# Patient Record
Sex: FEMALE | Employment: OTHER | URBAN - NONMETROPOLITAN AREA
[De-identification: names, ages, dates, MRNs, and addresses within clinical notes are randomized per-mention and may not be internally consistent; named-entity substitution may affect disease eponyms.]

---

## 2017-02-10 ENCOUNTER — MED REC SCAN ONLY (OUTPATIENT)
Dept: FAMILY MEDICINE CLINIC | Facility: CLINIC | Age: 27
End: 2017-02-10

## 2017-05-23 ENCOUNTER — OFFICE VISIT (OUTPATIENT)
Dept: FAMILY MEDICINE CLINIC | Facility: CLINIC | Age: 27
End: 2017-05-23

## 2017-05-23 VITALS
BODY MASS INDEX: 17 KG/M2 | SYSTOLIC BLOOD PRESSURE: 96 MMHG | OXYGEN SATURATION: 98 % | TEMPERATURE: 98 F | WEIGHT: 96 LBS | DIASTOLIC BLOOD PRESSURE: 64 MMHG | HEART RATE: 84 BPM

## 2017-05-23 DIAGNOSIS — J30.2 OTHER SEASONAL ALLERGIC RHINITIS: Primary | ICD-10-CM

## 2017-05-23 PROCEDURE — 99213 OFFICE O/P EST LOW 20 MIN: CPT | Performed by: FAMILY MEDICINE

## 2017-05-23 RX ORDER — MONTELUKAST SODIUM 10 MG/1
10 TABLET ORAL NIGHTLY
Qty: 30 TABLET | Refills: 1 | Status: SHIPPED | OUTPATIENT
Start: 2017-05-23 | End: 2017-07-16

## 2017-05-23 RX ORDER — PREDNISOLONE 15 MG/5 ML
1 SOLUTION, ORAL ORAL DAILY
Qty: 72.5 ML | Refills: 0 | Status: SHIPPED | OUTPATIENT
Start: 2017-05-23 | End: 2017-05-28

## 2017-05-23 NOTE — PROGRESS NOTES
HPI:   Leobardo Arcos is a 32year old female who presents for upper respiratory symptoms for  3  weeks. Patient reports congestion, dry cough, cough is keeping pt up at night, denies fever.   Here with father and caregiver    Current Outpatient [facility]crip INFANTS) 160 MG/5ML Oral Suspension Take 19.5 mL by mouth every 4 (four) hours as needed for Fever or Pain. Disp: 240 mL Rfl: 0   ibuprofen (MOTRIN) 100 MG/5ML Oral Suspension Take 10 mL by mouth every 6 (six) hours as needed.  Disp: 240 mL Rfl: 0   Choleca Hypertension Mother    • Other[other] [OTHER] Mother      HEMORRHAGIC STROKE        Smoking Status: Never Smoker                      Smokeless Status: Never Used                        Alcohol Use: No                  REVIEW OF SYSTEMS:   GENERAL: fever: allergen avoidance strategies. symptomatic treatment reviewed  Infection control reviewed  The patient indicates understanding of these issues and agrees to the plan. The patient is asked to return if sx's persist or worsen. [place] Mo.  Nathan Boehringer

## 2017-05-23 NOTE — PATIENT INSTRUCTIONS
Continue all same medications  May use albuterol nebulizer every 4 as needed for pulmonary toilet  We will start Prelone 15 mg/5 ML's, 15 mL's daily ×5 days  Singular 10 mg nightly  Keep head of bed elevated  Reviewed allergen avoidance strategies.

## 2017-06-30 ENCOUNTER — HOSPITAL ENCOUNTER (OUTPATIENT)
Age: 27
Discharge: HOME | End: 2017-06-30
Attending: FAMILY MEDICINE
Payer: MEDICARE

## 2017-06-30 VITALS
OXYGEN SATURATION: 97 % | WEIGHT: 94 LBS | HEART RATE: 73 BPM | TEMPERATURE: 99 F | BODY MASS INDEX: 17 KG/M2 | SYSTOLIC BLOOD PRESSURE: 102 MMHG | DIASTOLIC BLOOD PRESSURE: 60 MMHG | RESPIRATION RATE: 18 BRPM

## 2017-06-30 DIAGNOSIS — N30.00 ACUTE CYSTITIS WITHOUT HEMATURIA: Primary | ICD-10-CM

## 2017-06-30 LAB
POCT BILIRUBIN URINE: NEGATIVE
POCT GLUCOSE URINE: NEGATIVE MG/DL
POCT KETONE URINE: NEGATIVE MG/DL
POCT NITRITE URINE: NEGATIVE
POCT PH URINE: 7.5 (ref 5–8)
POCT PROTEIN URINE: NEGATIVE MG/DL
POCT SPECIFIC GRAVITY URINE: 1.01
POCT URINE CLARITY: CLEAR
POCT URINE COLOR: YELLOW
POCT URINE PREGNANCY: NEGATIVE
POCT UROBILINOGEN URINE: 0.2 MG/DL

## 2017-06-30 PROCEDURE — 99214 OFFICE O/P EST MOD 30 MIN: CPT

## 2017-06-30 PROCEDURE — 81002 URINALYSIS NONAUTO W/O SCOPE: CPT | Performed by: FAMILY MEDICINE

## 2017-06-30 PROCEDURE — 87086 URINE CULTURE/COLONY COUNT: CPT | Performed by: FAMILY MEDICINE

## 2017-06-30 PROCEDURE — 81025 URINE PREGNANCY TEST: CPT | Performed by: FAMILY MEDICINE

## 2017-07-01 NOTE — ED PROVIDER NOTES
Patient Seen in: [address] [facility] [place]    History   Patient presents with:  Urinary Symptoms (urologic)    Stated Complaint: UTI     HPI  33 yo F here with her father with hx of CP and multiple UTIs in the past caregiver told her parents that s BID PRN .  TH[address]OU[address]Y [address] HANDS AFTER USE   ferrous sulfate 220 (44 FE) MG/5ML Oral Elixir,  TAKE 1.5 ML VIA G-TUBE EVERY MORNING AND 2 ML EVERY EVENING   CUSTOM MEDICATION,  Granlex spray spray on hands to apply topically to affected area 2 times a day Solution,  by Intrathecal route. Diazepam (DIASTAT ACUDIAL) 10 MG Rectal Gel,  Place  rectally as needed. Glycopyrrolate (ROBINUL) 1 MG Oral Tab,  Take 1 mg by mouth 2 (two) times daily.        Family History   Problem Relation Age of Onset   • Hyperten - Normal   URINE CULTURE, ROUTINE       Orders Placed This Encounter      POCT urinalysis dipstick Once      POCT urine pregnancy Once      Urine Culture, Routine Once      Sulfamethoxazole-Trimethoprim 800-160 MG/20ML Oral Suspension          Si ml t

## 2017-07-17 RX ORDER — MONTELUKAST SODIUM 10 MG/1
TABLET ORAL
Qty: 90 TABLET | Refills: 0 | Status: SHIPPED | OUTPATIENT
Start: 2017-07-17 | End: 2017-10-17

## 2017-08-16 ENCOUNTER — MED REC SCAN ONLY (OUTPATIENT)
Dept: FAMILY MEDICINE CLINIC | Facility: CLINIC | Age: 27
End: 2017-08-16

## 2017-08-20 LAB
BUN: 15 MG/DL (ref 7–25)
CALCIUM: 9.5 MG/DL (ref 8.6–10.2)
CARBON DIOXIDE: 24 MMOL/L (ref 20–31)
CHLORIDE: 104 MMOL/L (ref 98–110)
CREATININE: 0.62 MG/DL (ref 0.5–1.1)
EGFR IF AFRICN AM: 144 ML/MIN/1.73M2
EGFR IF NONAFRICN AM: 124 ML/MIN/1.73M2
GLUCOSE: 98 MG/DL (ref 65–99)
MAGNESIUM: 2 MG/DL (ref 1.5–2.5)
POTASSIUM: 4.6 MMOL/L (ref 3.5–5.3)
SODIUM: 141 MMOL/L (ref 135–146)

## 2017-10-17 RX ORDER — MONTELUKAST SODIUM 10 MG/1
TABLET ORAL
Qty: 90 TABLET | Refills: 0 | Status: SHIPPED | OUTPATIENT
Start: 2017-10-17 | End: 2018-01-09

## 2017-10-17 NOTE — TELEPHONE ENCOUNTER
Last rf 7/17/17 #90x0  Last ov 5/23/17    Future Appointments  Date Time Provider Johanne Howard   10/24/2017 3:30 PM Mirela De Anda., DO EM[facility]

## 2017-10-24 ENCOUNTER — OFFICE VISIT (OUTPATIENT)
Dept: FAMILY MEDICINE CLINIC | Facility: CLINIC | Age: 27
End: 2017-10-24

## 2017-10-24 VITALS
SYSTOLIC BLOOD PRESSURE: 118 MMHG | DIASTOLIC BLOOD PRESSURE: 60 MMHG | TEMPERATURE: 98 F | BODY MASS INDEX: 17 KG/M2 | WEIGHT: 96 LBS | HEART RATE: 76 BPM | OXYGEN SATURATION: 98 %

## 2017-10-24 DIAGNOSIS — M41.9 KYPHOSCOLIOSIS AND SCOLIOSIS: ICD-10-CM

## 2017-10-24 DIAGNOSIS — H47.619 CORTICAL BLINDNESS, UNSPECIFIED LATERALITY: ICD-10-CM

## 2017-10-24 DIAGNOSIS — J30.2 OTHER SEASONAL ALLERGIC RHINITIS: ICD-10-CM

## 2017-10-24 DIAGNOSIS — Z00.00 PREVENTATIVE HEALTH CARE: Primary | ICD-10-CM

## 2017-10-24 DIAGNOSIS — G80.0 SPASTIC QUADRIPLEGIC CEREBRAL PALSY (HCC): ICD-10-CM

## 2017-10-24 DIAGNOSIS — K59.01 SLOW TRANSIT CONSTIPATION: ICD-10-CM

## 2017-10-24 DIAGNOSIS — R56.9 SEIZURES (HCC): ICD-10-CM

## 2017-10-24 DIAGNOSIS — K21.9 GASTROESOPHAGEAL REFLUX DISEASE WITHOUT ESOPHAGITIS: ICD-10-CM

## 2017-10-24 DIAGNOSIS — N31.9 NEUROGENIC BLADDER: ICD-10-CM

## 2017-10-24 PROCEDURE — G0008 ADMIN INFLUENZA VIRUS VAC: HCPCS | Performed by: FAMILY MEDICINE

## 2017-10-24 PROCEDURE — 90686 IIV4 VACC NO PRSV 0.5 ML IM: CPT | Performed by: FAMILY MEDICINE

## 2017-10-24 PROCEDURE — 99214 OFFICE O/P EST MOD 30 MIN: CPT | Performed by: FAMILY MEDICINE

## 2017-10-24 RX ORDER — METHENAMINE MANDELATE 1000 MG/1
1000 TABLET, FILM COATED ORAL 4 TIMES DAILY
COMMUNITY
End: 2020-04-13 | Stop reason: CLARIF

## 2017-10-24 RX ORDER — PSEUDOEPHEDRINE HYDROCHLORIDE 30 MG/1
30 TABLET ORAL 2 TIMES DAILY PRN
COMMUNITY

## 2017-10-24 RX ORDER — FLUTICASONE PROPIONATE 50 MCG
2 SPRAY, SUSPENSION (ML) NASAL DAILY
COMMUNITY

## 2017-10-24 NOTE — PATIENT INSTRUCTIONS
Reviewed age-appropriate preventative health screening exams as well as prevention of infection. Flu vaccine given today  We will need to monitor bowel movements.   Consider use of a suppository  Medication list reviewed  Would recommend routine GYN evalua

## 2017-10-24 NOTE — H&P
HPI:   Mckenzie Negron is a 32year old female Patient presents with:  Physical: [address] annual, [employer]  here with father and caregiver.   Reports of abd bloating, seems better after a BM, eats lots of cheese  Wt Readings from Last 6 Encounters:  10 Shortness of Breath. Disp: 1 Box Rfl: 1   Linaclotide (LINZESS) 145 MCG Oral Cap Take 145 mcg by mouth daily. Disp:  Rfl:    Misc Natural Products (CRANBERRY/PROBIOTIC OR) Take 1 tablet by mouth daily.  Disp:  Rfl:    Potassium Gluconate 595 MG Oral Cap Evangelina Michelle HISTORY      Comment: INTRATHECAL PUMP  No date: OTHER SURGICAL HISTORY      Comment: HAMSTRING LENGTHENING  No date: OTHER SURGICAL HISTORY      Comment: BILATERAL HIP SURGERY  No date: OTHER SURGICAL HISTORY      Comment: POSTERIOR SPINAL FUSION FOR SCOL methanamine  MUSCULOSKELETAL: denies back or joint pain  NEURO: denies headaches, tremors, dizziness, numbness, tingling, truncal weakness, no recent seizures  PSYCHE: denies depression or anxiety, denies any sleep difficulty  HEMATOLOGIC: denies unexplain constipation  Gastroesophageal reflux disease without esophagitis  Other seasonal allergic rhinitis  Kyphoscoliosis and scoliosis  Neurogenic bladder  Need for vaccination    Orders Placed This Encounter      FLULAVAL INFLUENZA VACCINE QUAD PRESERVATIVE FR

## 2017-11-06 ENCOUNTER — TELEPHONE (OUTPATIENT)
Dept: FAMILY MEDICINE CLINIC | Facility: CLINIC | Age: 27
End: 2017-11-06

## 2017-11-06 RX ORDER — ALBUTEROL SULFATE 2.5 MG/3ML
2.5 SOLUTION RESPIRATORY (INHALATION) EVERY 4 HOURS PRN
Qty: 1 BOX | Refills: 1 | Status: SHIPPED | OUTPATIENT
Start: 2017-11-06 | End: 2018-02-12

## 2017-12-15 ENCOUNTER — TELEPHONE (OUTPATIENT)
Dept: FAMILY MEDICINE CLINIC | Facility: CLINIC | Age: 27
End: 2017-12-15

## 2017-12-15 ENCOUNTER — HOSPITAL ENCOUNTER (OUTPATIENT)
Age: 27
Discharge: HOME | End: 2017-12-15
Payer: MEDICARE

## 2017-12-15 VITALS
RESPIRATION RATE: 16 BRPM | TEMPERATURE: 98 F | WEIGHT: 96 LBS | OXYGEN SATURATION: 98 % | DIASTOLIC BLOOD PRESSURE: 73 MMHG | HEART RATE: 77 BPM | SYSTOLIC BLOOD PRESSURE: 124 MMHG | BODY MASS INDEX: 17.01 KG/M2 | HEIGHT: 63 IN

## 2017-12-15 DIAGNOSIS — R10.30 LOWER ABDOMINAL PAIN: Primary | ICD-10-CM

## 2017-12-15 DIAGNOSIS — R63.0 DECREASED APPETITE: ICD-10-CM

## 2017-12-15 PROCEDURE — 99214 OFFICE O/P EST MOD 30 MIN: CPT

## 2017-12-15 PROCEDURE — 87086 URINE CULTURE/COLONY COUNT: CPT | Performed by: PHYSICIAN ASSISTANT

## 2017-12-15 PROCEDURE — 81002 URINALYSIS NONAUTO W/O SCOPE: CPT | Performed by: PHYSICIAN ASSISTANT

## 2017-12-15 PROCEDURE — 99213 OFFICE O/P EST LOW 20 MIN: CPT

## 2017-12-15 NOTE — ED INITIAL ASSESSMENT (HPI)
Dad sts lower abd pain for the past several days. Concerned about UTI as has them frequently. Decreased appetite. Mcneal loose stools. Denies fever, vomiting.

## 2017-12-15 NOTE — ED PROVIDER NOTES
Patient Seen in: [address] [facility]    History   Patient presents with:  Abdomen/Flank Pain (GI/)  Urinary Symptoms (urologic)    Stated Complaint: possible UTI    HPI  Omar Bruner is a 69-year-old female who presents with her father and a care LENGTHENING  No date: OTHER SURGICAL HISTORY      Comment: BILATERAL HIP SURGERY  No date: OTHER SURGICAL HISTORY      Comment: POSTERIOR SPINAL FUSION FOR SCOLIOSIS  2/2011  Ladi: OTHER SURGICAL HISTORY      Comment: Laparoscopic placement of WILFRED ish Abdominal: Soft. There is no tenderness. There is no CVA tenderness. Pump site and G-tube site without erythema or purulence. Non-tender. Neurological: She is alert and oriented to person, place, and time. Skin: Skin is warm.  She is not diaphoreti

## 2017-12-29 ENCOUNTER — TELEPHONE (OUTPATIENT)
Dept: FAMILY MEDICINE CLINIC | Facility: CLINIC | Age: 27
End: 2017-12-29

## 2017-12-29 NOTE — TELEPHONE ENCOUNTER
When was the neb machine prescribed? Pt has medicare and they want to know this info. He needs the albuteral liquid covered.

## 2018-01-09 RX ORDER — MONTELUKAST SODIUM 10 MG/1
TABLET ORAL
Qty: 90 TABLET | Refills: 0 | Status: SHIPPED | OUTPATIENT
Start: 2018-01-09 | End: 2018-04-12

## 2018-02-12 RX ORDER — ALBUTEROL SULFATE 2.5 MG/3ML
SOLUTION RESPIRATORY (INHALATION)
Qty: 75 ML | Refills: 2 | Status: SHIPPED | OUTPATIENT
Start: 2018-02-12 | End: 2018-06-07

## 2018-04-12 RX ORDER — MONTELUKAST SODIUM 10 MG/1
TABLET ORAL
Qty: 90 TABLET | Refills: 0 | Status: SHIPPED | OUTPATIENT
Start: 2018-04-12 | End: 2018-07-12

## 2018-06-08 RX ORDER — ALBUTEROL SULFATE 2.5 MG/3ML
SOLUTION RESPIRATORY (INHALATION)
Qty: 75 ML | Refills: 0 | Status: SHIPPED | OUTPATIENT
Start: 2018-06-08 | End: 2018-08-29

## 2018-07-12 RX ORDER — MONTELUKAST SODIUM 10 MG/1
TABLET ORAL
Qty: 90 TABLET | Refills: 0 | Status: SHIPPED | OUTPATIENT
Start: 2018-07-12 | End: 2018-10-09

## 2018-07-12 NOTE — TELEPHONE ENCOUNTER
Last OV: 5/23/2017  Last filled: 4/12/2018 #90 no RF    Future Appointments  Date Time Provider Johanne Howard   10/29/2018 3:30 PM Sally Lake., DO EM[facility]

## 2018-08-10 ENCOUNTER — TELEPHONE (OUTPATIENT)
Dept: FAMILY MEDICINE CLINIC | Facility: CLINIC | Age: 28
End: 2018-08-10

## 2018-08-10 NOTE — TELEPHONE ENCOUNTER
Philly Faria needs Sonal's disability paperwork fill out, he will drop it by this afternoon. pls call when ready.

## 2018-08-16 ENCOUNTER — MED REC SCAN ONLY (OUTPATIENT)
Dept: FAMILY MEDICINE CLINIC | Facility: CLINIC | Age: 28
End: 2018-08-16

## 2018-08-24 ENCOUNTER — TELEPHONE (OUTPATIENT)
Dept: FAMILY MEDICINE CLINIC | Facility: CLINIC | Age: 28
End: 2018-08-24

## 2018-08-28 ENCOUNTER — TELEPHONE (OUTPATIENT)
Dept: FAMILY MEDICINE CLINIC | Facility: CLINIC | Age: 28
End: 2018-08-28

## 2018-08-28 RX ORDER — MEDROXYPROGESTERONE ACETATE 150 MG/ML
150 INJECTION, SUSPENSION INTRAMUSCULAR
Qty: 1 ML | Refills: 0 | COMMUNITY
Start: 2018-08-28

## 2018-08-29 RX ORDER — ALBUTEROL SULFATE 2.5 MG/3ML
SOLUTION RESPIRATORY (INHALATION)
Qty: 75 ML | Refills: 0 | Status: SHIPPED | OUTPATIENT
Start: 2018-08-29 | End: 2019-01-14

## 2018-09-18 RX ORDER — CLOTRIMAZOLE AND BETAMETHASONE DIPROPIONATE 10; .64 MG/G; MG/G
CREAM TOPICAL
Qty: 15 G | Refills: 0 | Status: SHIPPED | OUTPATIENT
Start: 2018-09-18 | End: 2019-08-29

## 2018-09-18 NOTE — TELEPHONE ENCOUNTER
Discharge planning/Health Maintenance:  1)  screens:    --->thyroid   --->normal  2) CCHD screen: passed  3) Hearing screen: passed b/l   4) Carseat challenge: needed prior to discharge  5) Immunizations:   There is no immunization histor Last script 4/28/2016  Last OV 10/24/17

## 2018-10-09 RX ORDER — MONTELUKAST SODIUM 10 MG/1
TABLET ORAL
Qty: 90 TABLET | Refills: 0 | Status: SHIPPED | OUTPATIENT
Start: 2018-10-09 | End: 2019-01-09

## 2018-11-09 ENCOUNTER — OFFICE VISIT (OUTPATIENT)
Dept: FAMILY MEDICINE CLINIC | Facility: CLINIC | Age: 28
End: 2018-11-09
Payer: MEDICARE

## 2018-11-09 VITALS
WEIGHT: 95 LBS | BODY MASS INDEX: 17 KG/M2 | TEMPERATURE: 99 F | DIASTOLIC BLOOD PRESSURE: 60 MMHG | SYSTOLIC BLOOD PRESSURE: 100 MMHG

## 2018-11-09 DIAGNOSIS — K59.01 SLOW TRANSIT CONSTIPATION: ICD-10-CM

## 2018-11-09 DIAGNOSIS — Z00.00 ENCOUNTER FOR ANNUAL HEALTH EXAMINATION: Primary | ICD-10-CM

## 2018-11-09 DIAGNOSIS — H47.619 CORTICAL BLINDNESS, UNSPECIFIED LATERALITY: ICD-10-CM

## 2018-11-09 DIAGNOSIS — Z23 NEED FOR VACCINATION: ICD-10-CM

## 2018-11-09 DIAGNOSIS — R56.9 SEIZURES (HCC): ICD-10-CM

## 2018-11-09 DIAGNOSIS — J30.89 NON-SEASONAL ALLERGIC RHINITIS, UNSPECIFIED TRIGGER: ICD-10-CM

## 2018-11-09 DIAGNOSIS — K21.9 GASTROESOPHAGEAL REFLUX DISEASE WITHOUT ESOPHAGITIS: ICD-10-CM

## 2018-11-09 DIAGNOSIS — N31.9 NEUROGENIC BLADDER: ICD-10-CM

## 2018-11-09 DIAGNOSIS — M41.9 KYPHOSCOLIOSIS AND SCOLIOSIS: ICD-10-CM

## 2018-11-09 DIAGNOSIS — G80.0 SPASTIC QUADRIPLEGIC CEREBRAL PALSY (HCC): ICD-10-CM

## 2018-11-09 PROCEDURE — 90686 IIV4 VACC NO PRSV 0.5 ML IM: CPT | Performed by: FAMILY MEDICINE

## 2018-11-09 PROCEDURE — G0439 PPPS, SUBSEQ VISIT: HCPCS | Performed by: FAMILY MEDICINE

## 2018-11-09 PROCEDURE — G0008 ADMIN INFLUENZA VIRUS VAC: HCPCS | Performed by: FAMILY MEDICINE

## 2018-11-09 NOTE — PATIENT INSTRUCTIONS
Naga Freitas's SCREENING SCHEDULE   Tests on this list are recommended by your physician but may not be covered, or covered at this frequency, by your insurer. Please check with your insurance carrier before scheduling to verify coverage.    Jonas Decree Sigmoidoscopy Screen  Covered every 5 years No results found for this or any previous visit. No flowsheet data found. Fecal Occult Blood   Covered Annually No results found for: FOB, OCCULTSTOOL No flowsheet data found.      Barium Enema-   uncomfortabl VIRUS VACCINE, PRESERV FREE, >=1YEARS OF AGE    Please get every year    Pneumococcal 13 (Prevnar)  Covered Once after 65 No orders found for this or any previous visit.  Please get once after your 65th birthday    Pneumococcal 23 (Pneumovax)  Covered Once examination  I reviewed age-appropriate preventive health screening exams including immunizations. Flu vaccine provided.   Patient is not capable of self administering medication list to have all medication administered by a licensed or medication trained

## 2018-11-09 NOTE — H&P
HPI:   Elsa Cushing is a 29year old female Patient presents with:  Physical: Medicare AWV  here with father and aide, pt had labs in May with neurologist  Wt Readings from Last 6 Encounters:  11/09/18 : 95 lb  12/15/17 : 96 lb  10/24/17 : 96 lb  06/3 0.1 % External Cream Apply 1 Application topically daily as needed. Disp: 15 g Rfl: 0   valproic Acid 250 MG/5ML Oral Syrup by Per G Tube route 2 (two) times daily.  8 ml BID  Disp: 600 mL Rfl: 0   Polyethylene Glycol 3350 Oral Powder  Disp:  Rfl: 0   [facility] [address]      Past Surgical History:   Procedure Laterality Date   • OTHER SURGICAL HISTORY      G-TUBE   • OTHER SURGICAL HISTORY      HIMA FUNDOPLICATION   • OTHER SURGICAL HISTORY      BILTERAL EYE SURGIERIES   • OTHER SURGICAL HISTORY      INTRATHECAL miralax  : denies dysuria, vaginal discharge or itching, no menses on depo-provera, + incontinence , recurrent UTIs-managed on bid methanamine, saw Urologist several months ago, getting Depo-provera thru Dr Durant Males: denies back or joint yue trunk  EXTREMITIES:  no cyanosis, clubbing or edema, no varicosities  NEURO: Patient able to answer yes or no questions, occasional one-word answers, sensory grossly intact, DTRs +4/4 bilaterally  PSYCH:  Speech soft, appearance: neat, Affect:smiling  ASSE GLUCOSE (mg/dL)   Date Value   08/19/2017 98    Medicare covers annually or at 6-month intervals for prediabetic patients        Cardiovascular Disease Screening     Cholesterol, covered every 5 yrs including Total, LDL and Trigs No results found f condition    No results found for this or any previous visit. No flowsheet data found.     Recommended for 2 year anniversary or as ordered in After Visit Summary   Pap and Pelvic      Pap: Every 3 yrs age 21-65 or Pap+HPV every 5 yrs age 33-67, Covered chandra 10/06/12   • TETANUS AND DIPHTHERIA, PRESERVE FREE    This may be covered with your prescription benefits, but Medicare does not cover unless Medically needed    Zoster (Not covered by Medicare Part B) No orders found for this or any previous visit.  This m scoliosis  Monitor clinically. Monitor skin closely for any areas of breakdown at pressure points    9. Non-seasonal allergic rhinitis, unspecified trigger  Discussed common allergens and avoidance strategies. Continue current medication    10.  Need for

## 2018-11-10 ENCOUNTER — MED REC SCAN ONLY (OUTPATIENT)
Dept: FAMILY MEDICINE CLINIC | Facility: CLINIC | Age: 28
End: 2018-11-10

## 2019-01-10 RX ORDER — MONTELUKAST SODIUM 10 MG/1
TABLET ORAL
Qty: 90 TABLET | Refills: 0 | Status: SHIPPED | OUTPATIENT
Start: 2019-01-10 | End: 2019-04-15

## 2019-01-14 ENCOUNTER — OFFICE VISIT (OUTPATIENT)
Dept: FAMILY MEDICINE CLINIC | Facility: CLINIC | Age: 29
End: 2019-01-14
Payer: MEDICARE

## 2019-01-14 VITALS
TEMPERATURE: 99 F | SYSTOLIC BLOOD PRESSURE: 118 MMHG | DIASTOLIC BLOOD PRESSURE: 68 MMHG | HEART RATE: 74 BPM | OXYGEN SATURATION: 96 %

## 2019-01-14 DIAGNOSIS — G80.0 SPASTIC QUADRIPLEGIC CEREBRAL PALSY (HCC): ICD-10-CM

## 2019-01-14 DIAGNOSIS — J00 ACUTE NASOPHARYNGITIS: Primary | ICD-10-CM

## 2019-01-14 PROCEDURE — 99213 OFFICE O/P EST LOW 20 MIN: CPT | Performed by: FAMILY MEDICINE

## 2019-01-14 RX ORDER — ALBUTEROL SULFATE 2.5 MG/3ML
SOLUTION RESPIRATORY (INHALATION)
Qty: 75 ML | Refills: 2 | Status: SHIPPED | OUTPATIENT
Start: 2019-01-14

## 2019-01-14 NOTE — PROGRESS NOTES
HPI:    Patient ID: Gallo Simmons is a 29year old female. Patient presents with:  Nasal Congestion: pt getting over cold. . nasal congestion. .    Got sick x 4-5 days, better today, no fever, HA, pressure,nasal dc, eyes watering  Slight cough, +pnd times daily. 8 ml BID  Disp: 600 mL Rfl: 0   Linaclotide (LINZESS) 145 MCG Oral Cap Take 72 mcg by mouth 2 (two) times daily. Disp:  Rfl:    Misc Natural Products (CRANBERRY/PROBIOTIC OR) Take 1 tablet by mouth daily.  Disp:  Rfl:    Potassium Gluconate 5 patient   HENT:   Right Ear: Tympanic membrane normal.   Left Ear: Tympanic membrane normal.   Nose: Mucosal edema and rhinorrhea ( Copious clear mucoid discharge) present. Right sinus exhibits no maxillary sinus tenderness and no frontal sinus tenderness.

## 2019-01-14 NOTE — PATIENT INSTRUCTIONS
I reviewed the viral nature of the illness as well as anticipated duration, course of progression of symptoms to resolution.   I reviewed signs and symptoms of secondary bacterial infection  Nasal saline ad dwight., Mucinex or equivalent expectorant twice morena

## 2019-02-05 ENCOUNTER — MED REC SCAN ONLY (OUTPATIENT)
Dept: FAMILY MEDICINE CLINIC | Facility: CLINIC | Age: 29
End: 2019-02-05

## 2019-02-20 ENCOUNTER — HOSPITAL ENCOUNTER (OUTPATIENT)
Age: 29
Discharge: ANOTHER HOSPITAL | End: 2019-02-20
Attending: FAMILY MEDICINE
Payer: MEDICARE

## 2019-02-20 ENCOUNTER — TELEPHONE (OUTPATIENT)
Dept: FAMILY MEDICINE CLINIC | Facility: CLINIC | Age: 29
End: 2019-02-20

## 2019-02-20 VITALS
RESPIRATION RATE: 24 BRPM | WEIGHT: 94 LBS | TEMPERATURE: 99 F | SYSTOLIC BLOOD PRESSURE: 109 MMHG | DIASTOLIC BLOOD PRESSURE: 77 MMHG | OXYGEN SATURATION: 95 % | BODY MASS INDEX: 17 KG/M2 | HEART RATE: 149 BPM

## 2019-02-20 DIAGNOSIS — R41.82 ALTERED MENTAL STATUS, UNSPECIFIED ALTERED MENTAL STATUS TYPE: ICD-10-CM

## 2019-02-20 DIAGNOSIS — R53.83 LETHARGY: Primary | ICD-10-CM

## 2019-02-20 DIAGNOSIS — R14.0 ABDOMINAL BLOATING: ICD-10-CM

## 2019-02-20 PROCEDURE — 99213 OFFICE O/P EST LOW 20 MIN: CPT

## 2019-02-20 PROCEDURE — 99212 OFFICE O/P EST SF 10 MIN: CPT

## 2019-02-20 NOTE — ED INITIAL ASSESSMENT (HPI)
Pt started feeling uncomfortable last night. Patient was not acting like her usual self. Pt has been lethargic all day and has been given a lot of water per dad, but has not urinated very much throughout the day.  water given water orally and through g-tube

## 2019-02-21 NOTE — ED PROVIDER NOTES
Patient Seen in: [address] [facility] [place]    History   Patient presents with:  Constipation (gastrointestinal)  Abdominal Pain    Stated Complaint: Vomit-like Feeling,Difficulty Breathing,No Urination/Bloating    HPI    17-year-old female with a h [place]    G-button change   • OTHER SURGICAL HISTORY  2013    INTRATHECAL BACLOFEN PUMP EXCHANGE       Family history reviewed and is not pertinent to presenting problem.     Social History    Tobacco Use      Smoking status: Never Smoker      Smokeless t nerves are intact, motor and sensory are grossly intact        ED Course   Labs Reviewed - No data to display             MDM   36year-old female with history of cerebral palsy presenting with abdominal distention/bloating, nausea with a leaking G-tube.

## 2019-02-26 ENCOUNTER — TELEPHONE (OUTPATIENT)
Dept: FAMILY MEDICINE CLINIC | Facility: CLINIC | Age: 29
End: 2019-02-26

## 2019-02-26 NOTE — TELEPHONE ENCOUNTER
Please obtain records of hospitalization for review. I can determine my need to see her based on discharge records.   Specifically obtain any imaging, consultations, procedures, discharge summaries

## 2019-02-26 NOTE — TELEPHONE ENCOUNTER
PT. WENT TO THE ER LAST WED. UPPER BOWEL OBSTRUCTION THAT REQUIRED SURGERY. EVERYTHING WENT WELL AND PT. HOME. DAD IS ASKING IF DR. STEPHENS WOULD LIKE TO SEE NONI?

## 2019-02-27 NOTE — TELEPHONE ENCOUNTER
Request faxed to [facility] medical records dept @ [phone]. Dad advised that we will call him after Dr. Richelle Rollins reviews records.

## 2019-02-28 NOTE — TELEPHONE ENCOUNTER
I saw the admitting eval, but no dc summary, op note , dc instructions  I should probably see her sometime next week before I go on vacation, her main f/u should be the surgeon

## 2019-03-01 NOTE — TELEPHONE ENCOUNTER
Sent another request to [facility] for remaining records. Dad advised of below. He will c/b to schedule an OV next week when he knows what day she has to f/u with surgeon.

## 2019-03-06 ENCOUNTER — MED REC SCAN ONLY (OUTPATIENT)
Dept: FAMILY MEDICINE CLINIC | Facility: CLINIC | Age: 29
End: 2019-03-06

## 2019-03-23 ENCOUNTER — HOSPITAL ENCOUNTER (OUTPATIENT)
Age: 29
Discharge: HOME | End: 2019-03-23
Attending: FAMILY MEDICINE
Payer: MEDICARE

## 2019-03-23 VITALS
RESPIRATION RATE: 20 BRPM | TEMPERATURE: 99 F | WEIGHT: 97 LBS | HEART RATE: 128 BPM | BODY MASS INDEX: 17 KG/M2 | OXYGEN SATURATION: 98 %

## 2019-03-23 DIAGNOSIS — R68.89 FLU-LIKE SYMPTOMS: ICD-10-CM

## 2019-03-23 DIAGNOSIS — R10.9 ABDOMINAL PAIN, ACUTE: Primary | ICD-10-CM

## 2019-03-23 PROCEDURE — 99213 OFFICE O/P EST LOW 20 MIN: CPT

## 2019-03-23 PROCEDURE — 99212 OFFICE O/P EST SF 10 MIN: CPT

## 2019-03-23 NOTE — ED INITIAL ASSESSMENT (HPI)
Hx of bowel obstruction 1 month ago, fever 101 yesterday, sinus drainage, pt told caregiver she was short of breath. Last motrin at 0630, today caregiver reports no urine output and no bowel movement. States suctioning pt a lot lately.  Using afrin and mucc

## 2019-03-23 NOTE — ED PROVIDER NOTES
Patient Seen in: [address] [place]    History   Patient presents with:  Sinusitis  Shortness Of Breath  Fever (infectious)    Stated Complaint: Fever sob headache    HPI    54-year-old female previous history of cerebral palsy [employer]    Positive for stated complaint: Fever sob headache  Other systems are as noted in HPI. Constitutional and vital signs reviewed. All other systems reviewed and negative except as noted above.     Physical Exam     ED Triage Vitals [03/23/19 0

## 2019-03-27 ENCOUNTER — OFFICE VISIT (OUTPATIENT)
Dept: FAMILY MEDICINE CLINIC | Facility: CLINIC | Age: 29
End: 2019-03-27
Payer: MEDICARE

## 2019-03-27 VITALS
SYSTOLIC BLOOD PRESSURE: 100 MMHG | TEMPERATURE: 99 F | OXYGEN SATURATION: 97 % | DIASTOLIC BLOOD PRESSURE: 64 MMHG | HEART RATE: 90 BPM

## 2019-03-27 DIAGNOSIS — R05.9 COUGH: ICD-10-CM

## 2019-03-27 DIAGNOSIS — G80.0 SPASTIC QUADRIPLEGIC CEREBRAL PALSY (HCC): ICD-10-CM

## 2019-03-27 DIAGNOSIS — J00 ACUTE NASOPHARYNGITIS: ICD-10-CM

## 2019-03-27 DIAGNOSIS — R19.7 DIARRHEA, UNSPECIFIED TYPE: ICD-10-CM

## 2019-03-27 DIAGNOSIS — H47.619 CORTICAL BLINDNESS, UNSPECIFIED LATERALITY: ICD-10-CM

## 2019-03-27 DIAGNOSIS — M41.9 KYPHOSCOLIOSIS AND SCOLIOSIS: ICD-10-CM

## 2019-03-27 DIAGNOSIS — N30.01 ACUTE CYSTITIS WITH HEMATURIA: Primary | ICD-10-CM

## 2019-03-27 DIAGNOSIS — K56.609 SMALL BOWEL OBSTRUCTION (HCC): ICD-10-CM

## 2019-03-27 PROCEDURE — 99214 OFFICE O/P EST MOD 30 MIN: CPT | Performed by: FAMILY MEDICINE

## 2019-03-27 RX ORDER — NITROFURANTOIN 25; 75 MG/1; MG/1
CAPSULE ORAL
Refills: 0 | COMMUNITY
Start: 2019-03-23 | End: 2020-04-13 | Stop reason: CLARIF

## 2019-03-27 NOTE — PATIENT INSTRUCTIONS
I reviewed provided information regarding patient's ER visit consisting of labs.   We will attempt to obtain full records  With regard to diarrhea, would hold Linzess for several days until stools become firmer, increase probiotic to twice daily, increase f

## 2019-03-27 NOTE — PROGRESS NOTES
HPI:    Patient ID: Juliann Bo is a 29year old female. Patient presents with:  ER F/U: Saturday ----- has had green diarrhea, headaches.      Here with caregiver  [facility] with complaints of abdominal pain and (two) times daily. 8 ml BID  Disp: 600 mL Rfl: 0   Linaclotide (LINZESS) 145 MCG Oral Cap Take 72 mcg by mouth 2 (two) times daily. Disp:  Rfl:    Misc Natural Products (CRANBERRY/PROBIOTIC OR) Take 1 tablet by mouth daily.  Disp:  Rfl:    Potassium Gluco 0   ibuprofen (MOTRIN) 100 MG/5ML Oral Suspension Take 10 mL by mouth every 6 (six) hours as needed. Disp: 240 mL Rfl: 0   Ascorbic Acid (VITAMIN C) 500 MG Oral Tab Take 500 mg by mouth daily.  Disp:  Rfl:    Glycerin, Laxative, (GLYCERIN, CHILD, RE) Place nebulizer treatments up to every 4 hours as needed for cough or congestion  I reviewed signs and symptoms of respiratory distress  Finish nitrofurantoin as prescribed, monitor any abdominal distention and bowel habits  Monitor clinically and call with an u

## 2019-04-15 ENCOUNTER — TELEPHONE (OUTPATIENT)
Dept: FAMILY MEDICINE CLINIC | Facility: CLINIC | Age: 29
End: 2019-04-15

## 2019-04-15 RX ORDER — MONTELUKAST SODIUM 10 MG/1
TABLET ORAL
Qty: 90 TABLET | Refills: 1 | Status: SHIPPED | OUTPATIENT
Start: 2019-04-15 | End: 2019-10-21

## 2019-04-15 NOTE — TELEPHONE ENCOUNTER
[facility]. SENT TO SCAN STAT 4/15/19. NO EMR RERCORDS.   DOS REQ:  4/10/2018-4/13/2019

## 2019-05-21 ENCOUNTER — OFFICE VISIT (OUTPATIENT)
Dept: FAMILY MEDICINE CLINIC | Facility: CLINIC | Age: 29
End: 2019-05-21
Payer: MEDICARE

## 2019-05-21 VITALS
OXYGEN SATURATION: 97 % | DIASTOLIC BLOOD PRESSURE: 64 MMHG | BODY MASS INDEX: 21 KG/M2 | TEMPERATURE: 98 F | SYSTOLIC BLOOD PRESSURE: 100 MMHG | WEIGHT: 116 LBS | HEART RATE: 80 BPM

## 2019-05-21 DIAGNOSIS — K56.609 SMALL BOWEL OBSTRUCTION (HCC): Primary | ICD-10-CM

## 2019-05-21 DIAGNOSIS — K59.01 SLOW TRANSIT CONSTIPATION: ICD-10-CM

## 2019-05-21 DIAGNOSIS — G80.0 SPASTIC QUADRIPLEGIC CEREBRAL PALSY (HCC): ICD-10-CM

## 2019-05-21 PROCEDURE — 1111F DSCHRG MED/CURRENT MED MERGE: CPT | Performed by: FAMILY MEDICINE

## 2019-05-21 PROCEDURE — 99213 OFFICE O/P EST LOW 20 MIN: CPT | Performed by: FAMILY MEDICINE

## 2019-05-21 RX ORDER — BISACODYL 10 MG
10 SUPPOSITORY, RECTAL RECTAL DAILY
Qty: 30 SUPPOSITORY | Refills: 0 | Status: SHIPPED | OUTPATIENT
Start: 2019-05-21

## 2019-05-21 NOTE — PROGRESS NOTES
HPI:    Patient ID: Karen Powell is a 29year old female. Patient presents with:  Hospital F/U: SBO    Patient was seen at Dr. Magdaleno Klein office on 5/3/2019 for abdominal distention. A CT scan showed high-grade small bowel obstruction.   Patient was times daily as needed. Disp:  Rfl:    silver sulfADIAZINE 1 % External Cream Apply topically 2 (two) times daily as needed. Disp:  Rfl:    Cetirizine HCl (ZYRTEC CHILDRENS ALLERGY) 5 MG/5ML Oral Syrup Take 10 mL by mouth daily.  Disp:  Rfl:    Pseudoephedri mouth daily. Disp:  Rfl:    Glycerin, Laxative, (GLYCERIN, CHILD, RE) Place  rectally. Disp:  Rfl:    Baclofen 0.05 MG/ML Intrathecal Solution by Intrathecal route. Disp:  Rfl:    Diazepam (DIASTAT ACUDIAL) 10 MG Rectal Gel Place  rectally as needed.  Disp: total) rectally daily.        Imaging & Referrals:  None       #[id number]

## 2019-05-21 NOTE — PATIENT INSTRUCTIONS
I have asked father to sign a medical release so that I may obtain records for review. I reviewed bowel regimen to prevent constipation/obstipation leading to bowel obstruction.   Would at least double current fluid intake, would recommend MiraLAX one half

## 2019-06-04 ENCOUNTER — TELEPHONE (OUTPATIENT)
Dept: FAMILY MEDICINE CLINIC | Facility: CLINIC | Age: 29
End: 2019-06-04

## 2019-06-04 NOTE — TELEPHONE ENCOUNTER
WENT TO ER [facility] HAD BOWEL OBSTRUCTION AND HAD [facility] SURGERY AND THEY REMOVED 2/3 OF SMALL BOWEL.  LUIS

## 2019-07-28 LAB
ALBUMIN/GLOBULIN RATIO: 1.5 (CALC) (ref 1–2.5)
ALBUMIN: 4.2 G/DL (ref 3.6–5.1)
ALKALINE PHOSPHATASE: 40 U/L (ref 33–115)
ALT: 13 U/L (ref 6–29)
AST: 12 U/L (ref 10–30)
BILIRUBIN, TOTAL: 0.2 MG/DL (ref 0.2–1.2)
BUN: 11 MG/DL (ref 7–25)
CALCIUM: 9.7 MG/DL (ref 8.6–10.2)
CARBON DIOXIDE: 27 MMOL/L (ref 20–32)
CHLORIDE: 105 MMOL/L (ref 98–110)
CREATININE: 0.58 MG/DL (ref 0.5–1.1)
EGFR IF AFRICN AM: 145 ML/MIN/1.73M2
EGFR IF NONAFRICN AM: 125 ML/MIN/1.73M2
GLOBULIN: 2.8 G/DL (CALC) (ref 1.9–3.7)
GLUCOSE: 84 MG/DL (ref 65–99)
POTASSIUM: 4.7 MMOL/L (ref 3.5–5.3)
PROTEIN, TOTAL: 7 G/DL (ref 6.1–8.1)
SODIUM: 142 MMOL/L (ref 135–146)

## 2019-08-30 RX ORDER — CLOTRIMAZOLE AND BETAMETHASONE DIPROPIONATE 10; .64 MG/G; MG/G
CREAM TOPICAL
Qty: 15 G | Refills: 1 | Status: SHIPPED | OUTPATIENT
Start: 2019-08-30 | End: 2020-11-16

## 2019-10-21 RX ORDER — MONTELUKAST SODIUM 10 MG/1
TABLET ORAL
Qty: 90 TABLET | Refills: 0 | Status: SHIPPED | OUTPATIENT
Start: 2019-10-21 | End: 2020-01-27

## 2019-10-21 NOTE — TELEPHONE ENCOUNTER
Last refill #90 x 1 on 4/15/19  Last office visit on 5/21/19  Future Appointments   Date Time Provider Johanne Howard   11/11/2019  3:45 PM Delmar Penaloza., DO EM[facility]

## 2019-11-11 ENCOUNTER — TELEPHONE (OUTPATIENT)
Dept: FAMILY MEDICINE CLINIC | Facility: CLINIC | Age: 29
End: 2019-11-11

## 2019-11-11 ENCOUNTER — OFFICE VISIT (OUTPATIENT)
Dept: FAMILY MEDICINE CLINIC | Facility: CLINIC | Age: 29
End: 2019-11-11
Payer: MEDICARE

## 2019-11-11 VITALS
OXYGEN SATURATION: 97 % | WEIGHT: 102 LBS | HEART RATE: 101 BPM | BODY MASS INDEX: 18 KG/M2 | SYSTOLIC BLOOD PRESSURE: 104 MMHG | TEMPERATURE: 98 F | DIASTOLIC BLOOD PRESSURE: 68 MMHG

## 2019-11-11 DIAGNOSIS — Z00.00 ENCOUNTER FOR ANNUAL HEALTH EXAMINATION: Primary | ICD-10-CM

## 2019-11-11 DIAGNOSIS — K21.9 GASTROESOPHAGEAL REFLUX DISEASE WITHOUT ESOPHAGITIS: ICD-10-CM

## 2019-11-11 DIAGNOSIS — H47.619 CORTICAL BLINDNESS, UNSPECIFIED LATERALITY: ICD-10-CM

## 2019-11-11 DIAGNOSIS — R56.9 SEIZURES (HCC): ICD-10-CM

## 2019-11-11 DIAGNOSIS — M41.9 KYPHOSCOLIOSIS AND SCOLIOSIS: ICD-10-CM

## 2019-11-11 DIAGNOSIS — J30.89 NON-SEASONAL ALLERGIC RHINITIS, UNSPECIFIED TRIGGER: ICD-10-CM

## 2019-11-11 DIAGNOSIS — K59.09 CHRONIC CONSTIPATION: ICD-10-CM

## 2019-11-11 DIAGNOSIS — N31.9 NEUROGENIC BLADDER: ICD-10-CM

## 2019-11-11 DIAGNOSIS — J00 ACUTE NASOPHARYNGITIS: ICD-10-CM

## 2019-11-11 DIAGNOSIS — G80.0 SPASTIC QUADRIPLEGIC CEREBRAL PALSY (HCC): ICD-10-CM

## 2019-11-11 DIAGNOSIS — Z23 NEED FOR VACCINATION: ICD-10-CM

## 2019-11-11 PROBLEM — K56.609 SMALL BOWEL OBSTRUCTION (HCC): Status: RESOLVED | Noted: 2019-03-06 | Resolved: 2019-11-11

## 2019-11-11 PROCEDURE — 90686 IIV4 VACC NO PRSV 0.5 ML IM: CPT | Performed by: FAMILY MEDICINE

## 2019-11-11 PROCEDURE — G0439 PPPS, SUBSEQ VISIT: HCPCS | Performed by: FAMILY MEDICINE

## 2019-11-11 PROCEDURE — G0008 ADMIN INFLUENZA VIRUS VAC: HCPCS | Performed by: FAMILY MEDICINE

## 2019-11-11 PROCEDURE — 99213 OFFICE O/P EST LOW 20 MIN: CPT | Performed by: FAMILY MEDICINE

## 2019-11-11 RX ORDER — MULTIVITAMIN WITH IRON
250 TABLET ORAL 2 TIMES DAILY
COMMUNITY

## 2019-11-11 NOTE — H&P
HPI:   Hollie Arroyo is a 34year old female Patient presents with:  Physical: Medicare AWV  here with father  Stuffy nose, no fever, runny nose, no cough  Wt Readings from Last 6 Encounters:  11/11/19 : 102 lb (46.3 kg)  05/21/19 : 116 lb (52.6 kg)  0 MCG/ACT Nasal Suspension 2 sprays by Each Nare route daily. • Methenamine Mandelate 1 G Oral Tab Take 1,000 mg by mouth 4 (four) times daily. • Mometasone Furoate 0.1 % External Cream Apply 1 Application topically daily as needed.  15 g 0   • valpro • Esophageal reflux     [facility]   • History of chicken pox    • Seizures ([facility] [address]     SEES DR Avon Simmonds      Past Surgical History:   Procedure Laterality Date   • BOWEL RESECTION  06/03/2019    small bowel resection due to S blindness  HEENT: clear nasal drainage and congestion, pnd, denies ST, denies snoring and reported periods of apnea, denies hearing deficits, +drooling  LUNGS: denies shortness of breath with exertion, no coughing or wheezing  CARDIOVASCULAR: denies chest pale and congested with clear mucoid dc,Septum midline, Pharynx: no pnd, erythema, or airway obstruction, good dental repair  EYES:PERRLA, dysconjugate gaze,  Fundi: benign,conjunctiva are clear  NECK: supple,no adenopathy,no bruits, no thyromegaly or palp scheduling to verify coverage. PREVENTATIVE SERVICES  INDICATIONS AND SCHEDULE Internal Lab or Procedure External Lab or Procedure   Diabetes Screening      HbgA1C    At Least  Annually for Diabetics No results found for: A1C No flowsheet data found. found.      Barium Enema-   uncomfortable but covered  Covered but uncomfortable   Glaucoma Screening      Ophthalmology Visit   Covered annually for Diabetics, people with Glaucoma family history,   Americans over age 48   Americans over age Pneumococcal 23 (Pneumovax)  Covered Once after 65 Orders placed or performed in visit on 10/03/12   • PNEUMOCOCCAL IMMUNIZATION    Please get once after your 65th birthday    Hepatitis B for Moderate/High Risk       No orders found for this or any previou capable of self administering medication and must have all medication administered by a licensed her medication trained staff or parent. Will obtain records of most recent gynecologic exam.  Most recent labs from July reviewed.   Follow-up with specialist

## 2019-11-11 NOTE — H&P
HPI:   Syed Jenkins is a 34year old female Patient presents with:  Physical: Medicare AWV    Wt Readings from Last 6 Encounters:  05/21/19 : 116 lb (52.6 kg)  03/23/19 : 97 lb (44 kg)  02/20/19 : 94 lb (42.6 kg)  11/09/18 : 95 lb (43.1 kg)  12/15/17 Propionate 50 MCG/ACT Nasal Suspension 2 sprays by Each Nare route daily. • Methenamine Mandelate 1 G Oral Tab Take 1,000 mg by mouth 4 (four) times daily. • Mometasone Furoate 0.1 % External Cream Apply 1 Application topically daily as needed.  15 Seizures ([facility] [address]     SEES DR Karan Mo      Past Surgical History:   Procedure Laterality Date   • BOWEL RESECTION  06/03/2019    small bowel resection due to SBO @ [facility]   • OTHER SURGICAL HISTORY      G-TUBE   • OTHER SURGICAL HISTORY      BERNARD in bowel habits, all meds thru g-tube, all nutrition po except when decreased intake, constipation -controlled on miralax  : denies dysuria, vaginal discharge or itching, no menses on depo-provera, + incontinence , recurrent UTIs-managed on bid methanami percussion  :  deferred  RECTAL:deferred  MUSCULOSKELETAL: + scoliosis, + flexion contractures of both knees, elbows and wrists, hypotonia of the trunk  EXTREMITIES:  no cyanosis, clubbing or edema, no varicosities  NEURO: Patient able to answer yes or n

## 2019-11-11 NOTE — PATIENT INSTRUCTIONS
Eber Freitas's SCREENING SCHEDULE   Tests on this list are recommended by your physician but may not be covered, or covered at this frequency, by your insurer. Please check with your insurance carrier before scheduling to verify coverage.    Gregory Turpin Sigmoidoscopy Screen  Covered every 5 years No results found for this or any previous visit. No flowsheet data found. Fecal Occult Blood   Covered Annually No results found for: FOB, OCCULTSTOOL No flowsheet data found.      Barium Enema-   uncomfortabl VIRUS VACCINE, PRESERV FREE, >=1YEARS OF AGE    Please get every year    Pneumococcal 13 (Prevnar)  Covered Once after 65 No orders found for this or any previous visit.  Please get once after your 65th birthday    Pneumococcal 23 (Pneumovax)  Covered Once examination  I reviewed age-appropriate screening exams as well as advanced directives. Discussed safety concerns both in and out of the home. Patient continues to need assistance with all ADLs.   She is not capable of self administering medication and mu

## 2020-01-27 RX ORDER — MONTELUKAST SODIUM 10 MG/1
TABLET ORAL
Qty: 90 TABLET | Refills: 0 | Status: SHIPPED | OUTPATIENT
Start: 2020-01-27 | End: 2020-04-29

## 2020-02-19 ENCOUNTER — OFFICE VISIT (OUTPATIENT)
Dept: FAMILY MEDICINE CLINIC | Facility: CLINIC | Age: 30
End: 2020-02-19
Payer: MEDICARE

## 2020-02-19 VITALS
BODY MASS INDEX: 18 KG/M2 | SYSTOLIC BLOOD PRESSURE: 100 MMHG | TEMPERATURE: 98 F | DIASTOLIC BLOOD PRESSURE: 66 MMHG | RESPIRATION RATE: 20 BRPM | WEIGHT: 102 LBS | HEART RATE: 96 BPM

## 2020-02-19 DIAGNOSIS — Z98.890 S/P EXPLORATORY LAPAROTOMY: ICD-10-CM

## 2020-02-19 DIAGNOSIS — G80.0 SPASTIC QUADRIPLEGIC CEREBRAL PALSY (HCC): ICD-10-CM

## 2020-02-19 DIAGNOSIS — K59.09 CHRONIC CONSTIPATION: ICD-10-CM

## 2020-02-19 DIAGNOSIS — N31.9 NEUROGENIC BLADDER: ICD-10-CM

## 2020-02-19 DIAGNOSIS — R56.9 SEIZURES (HCC): ICD-10-CM

## 2020-02-19 DIAGNOSIS — Z87.19 S/P SMALL BOWEL OBSTRUCTION: Primary | ICD-10-CM

## 2020-02-19 PROCEDURE — 99214 OFFICE O/P EST MOD 30 MIN: CPT | Performed by: FAMILY MEDICINE

## 2020-02-19 NOTE — PATIENT INSTRUCTIONS
I reviewed available hospital records including imaging, labs, pathology and microbiology.   I reviewed discharge instructions  Would recommend restarting MiraLAX one half capful in 8 ounces of water daily via G-tube  Reviewed signs and symptoms of bowel ob

## 2020-02-19 NOTE — PROGRESS NOTES
HPI:    Patient ID: Anabel Woodruff is a 34year old female.     Patient presents with:  Hospital F/U: post-op      Date of Admission: 2/8/2020    Date of Discharge: 2/14/2020  Discharge condition is stable  Admitting Attending: Jacob Carias MD HISTORY  2013    INTRATHECAL BACLOFEN PUMP EXCHANGE   • OTHER SURGICAL HISTORY  02/19/2020    exp lap w/ lysis of adhesions     Review of Systems   Constitutional: Positive for appetite change ( improving). Negative for fever. HENT: Negative.     Respirat Nasal Suspension 2 sprays by Each Nare route daily. • Methenamine Mandelate 1 G Oral Tab Take 1,000 mg by mouth 4 (four) times daily. • Mometasone Furoate 0.1 % External Cream Apply 1 Application topically daily as needed.  15 g 0   • valproic Acid heard.  Pulmonary/Chest: Effort normal and breath sounds normal.   Abdominal: Soft. Bowel sounds are normal. She exhibits no distension. There is no tenderness. There is no rigidity, no rebound and no guarding.    Surgical incision w/ clean dressing in plac

## 2020-04-03 ENCOUNTER — TELEPHONE (OUTPATIENT)
Dept: FAMILY MEDICINE CLINIC | Facility: CLINIC | Age: 30
End: 2020-04-03

## 2020-04-03 NOTE — TELEPHONE ENCOUNTER
L/m for dad to c/b----    When and where is procedure being done? By whom?  We need pre-op paperwork/ orders

## 2020-04-03 NOTE — TELEPHONE ENCOUNTER
Pre-op orders/info received. Pre-op scheduled. Advised father to call from parking lot before entry.     Future Appointments   Date Time Provider Johanne Howard   4/13/2020  9:30 AM [facility] Lucero[facility]

## 2020-04-08 ENCOUNTER — MED REC SCAN ONLY (OUTPATIENT)
Dept: FAMILY MEDICINE CLINIC | Facility: CLINIC | Age: 30
End: 2020-04-08

## 2020-04-08 ENCOUNTER — LAB ENCOUNTER (OUTPATIENT)
Dept: LAB | Age: 30
End: 2020-04-08
Attending: NEUROLOGICAL SURGERY
Payer: MEDICARE

## 2020-04-08 DIAGNOSIS — T85.610S: ICD-10-CM

## 2020-04-08 DIAGNOSIS — Z01.818 PRE-OP TESTING: Primary | ICD-10-CM

## 2020-04-08 DIAGNOSIS — G80.0 SPASTIC QUADRIPLEGIC CEREBRAL PALSY (HCC): ICD-10-CM

## 2020-04-08 PROCEDURE — 84703 CHORIONIC GONADOTROPIN ASSAY: CPT

## 2020-04-08 PROCEDURE — 85610 PROTHROMBIN TIME: CPT

## 2020-04-08 PROCEDURE — 85730 THROMBOPLASTIN TIME PARTIAL: CPT

## 2020-04-08 PROCEDURE — 85025 COMPLETE CBC W/AUTO DIFF WBC: CPT

## 2020-04-08 PROCEDURE — 80048 BASIC METABOLIC PNL TOTAL CA: CPT

## 2020-04-13 ENCOUNTER — OFFICE VISIT (OUTPATIENT)
Dept: FAMILY MEDICINE CLINIC | Facility: CLINIC | Age: 30
End: 2020-04-13
Payer: MEDICARE

## 2020-04-13 VITALS
DIASTOLIC BLOOD PRESSURE: 74 MMHG | HEART RATE: 78 BPM | TEMPERATURE: 99 F | HEIGHT: 63 IN | WEIGHT: 97 LBS | BODY MASS INDEX: 17.19 KG/M2 | SYSTOLIC BLOOD PRESSURE: 110 MMHG | RESPIRATION RATE: 18 BRPM

## 2020-04-13 DIAGNOSIS — K59.09 CHRONIC CONSTIPATION: ICD-10-CM

## 2020-04-13 DIAGNOSIS — R56.9 SEIZURES (HCC): ICD-10-CM

## 2020-04-13 DIAGNOSIS — G80.0 SPASTIC QUADRIPLEGIC CEREBRAL PALSY (HCC): ICD-10-CM

## 2020-04-13 DIAGNOSIS — N31.9 NEUROGENIC BLADDER: ICD-10-CM

## 2020-04-13 DIAGNOSIS — H47.619 CORTICAL BLINDNESS, UNSPECIFIED LATERALITY: ICD-10-CM

## 2020-04-13 DIAGNOSIS — M41.9 KYPHOSCOLIOSIS AND SCOLIOSIS: ICD-10-CM

## 2020-04-13 DIAGNOSIS — Z01.818 PRE-OP EVALUATION: Primary | ICD-10-CM

## 2020-04-13 DIAGNOSIS — K21.9 GASTROESOPHAGEAL REFLUX DISEASE WITHOUT ESOPHAGITIS: ICD-10-CM

## 2020-04-13 PROCEDURE — 99214 OFFICE O/P EST MOD 30 MIN: CPT | Performed by: FAMILY MEDICINE

## 2020-04-13 RX ORDER — MEDROXYPROGESTERONE ACETATE 150 MG/ML
INJECTION, SUSPENSION INTRAMUSCULAR
COMMUNITY
Start: 2020-03-25 | End: 2020-04-13 | Stop reason: CLARIF

## 2020-04-13 RX ORDER — METHENAMINE HIPPURATE 1000 MG/1
1 TABLET ORAL EVERY 12 HOURS
COMMUNITY
Start: 2020-02-26

## 2020-04-13 RX ORDER — VALPROIC ACID 250 MG/5ML
SOLUTION ORAL
COMMUNITY
Start: 2020-02-18 | End: 2020-04-13 | Stop reason: DRUGHIGH

## 2020-04-13 RX ORDER — CLONAZEPAM 0.12 MG/1
TABLET, ORALLY DISINTEGRATING ORAL
COMMUNITY
Start: 2020-01-23 | End: 2021-12-07

## 2020-04-13 NOTE — H&P
HPI:   Syed Jenkins is a 34year old female Patient presents with:  Pre-Op Exam: baclofen pump replacement  Patient is being scheduled for a baclofen pump replacement on 4/20/2020 by Dr. Geronimo Mai at [facility].  Pt is here w/ her f of baclofen pump failure  6   • lansoprazole (PREVACID) 30 MG Oral Capsule Delayed Release SHAKE LIQUID WELL AND GIVE NONI 5 ML BY MOUTH EVERY MORNING BEFORE BREAKFAST  0   • Baclofen 0.05 MG/ML Intrathecal Solution by Intrathecal route.      • Glycopyrrol CHILD, RE) Place  rectally. • Diazepam (DIASTAT ACUDIAL) 10 MG Rectal Gel Place  rectally as needed.          Seasonal                     Past Medical History:   Diagnosis Date   • Chronic constipation    • Chronic rhinitis    • Cortical blindness    • via G-tube between's 600 mL and 1000 mL daily to supplement oral intake  Specialists: Dr Maddi Whipple- pain pump , Dr Young- gastroenterologist, Dr Steve Griggs- neurology in May, Dr Noemy Ocasio- Urologist, Dr Richard Gómez:   GENERAL: generally f dc,Septum midline, Pharynx: no pnd, erythema, or airway obstruction, good dental repair  EYES:PERRLA, dysconjugate gaze,  Fundi: benign,conjunctiva are clear  NECK: supple,no adenopathy,no bruits, no thyromegaly or palpable nodules  BREAST: deferred  LUNGS may cause a false-positive or false-negative result in this assay. If an hCG level is inconsistent with, or unsupported by, clinical evidence, results should be confirmed by an alternate hCG method (eg. Urine Qualitative hCG assay).     This test may exhib 04/08/2020 0.03  0.00 - 0.20 x10(3) uL Final   • Immature Granulocyte Absolute 04/08/2020 0.02  0.00 - 1.00 x10(3) uL Final   • Neutrophil % 04/08/2020 56.6  % Final   • Lymphocyte % 04/08/2020 32.6  % Final   • Monocyte % 04/08/2020 9.8  % Final   • Eosin

## 2020-04-29 RX ORDER — MONTELUKAST SODIUM 10 MG/1
TABLET ORAL
Qty: 90 TABLET | Refills: 0 | Status: SHIPPED | OUTPATIENT
Start: 2020-04-29 | End: 2020-07-28

## 2020-04-29 NOTE — TELEPHONE ENCOUNTER
Last refill #90 on 1/27/2020  Last office visit on 4/813/2020  No future appointments.   Refilled per [id number] protocol

## 2020-07-28 RX ORDER — MONTELUKAST SODIUM 10 MG/1
TABLET ORAL
Qty: 90 TABLET | Refills: 1 | Status: SHIPPED | OUTPATIENT
Start: 2020-07-28 | End: 2021-02-01

## 2020-08-03 ENCOUNTER — MED REC SCAN ONLY (OUTPATIENT)
Dept: FAMILY MEDICINE CLINIC | Facility: CLINIC | Age: 30
End: 2020-08-03

## 2020-08-06 ENCOUNTER — TELEPHONE (OUTPATIENT)
Dept: FAMILY MEDICINE CLINIC | Facility: CLINIC | Age: 30
End: 2020-08-06

## 2020-08-06 NOTE — TELEPHONE ENCOUNTER
RELEASED FROM [facility] YESTERDAY. FOR ? BACLACIN PUMP ISSUES. BUT LAST NIGHT DAD THINKS SHE VOMITED HE IS CONCERNED ABOUT POSSIBLE BOWEL BLOCKAGE AGAIN? ABDOMEN EXTENDED AND HARD TO TOUCH. NO FEVER LAST NIGHT.      SHE HAS FEVER 100.3 ALIDA MONTERO

## 2020-08-07 NOTE — TELEPHONE ENCOUNTER
Dad called today with an update. Pt went to [facility] ER yesterday and was taken into emergency surgery to remove bowel blockage. He stated they had to remove scar tissue from previous surgery.  Pt is doing well and they hope to be released early next wee

## 2020-09-09 ENCOUNTER — HOSPITAL ENCOUNTER (OUTPATIENT)
Age: 30
Discharge: ANOTHER HOSPITAL | End: 2020-09-09
Payer: MEDICARE

## 2020-09-09 ENCOUNTER — TELEPHONE (OUTPATIENT)
Dept: FAMILY MEDICINE CLINIC | Facility: CLINIC | Age: 30
End: 2020-09-09

## 2020-09-09 VITALS
RESPIRATION RATE: 18 BRPM | DIASTOLIC BLOOD PRESSURE: 54 MMHG | HEART RATE: 82 BPM | SYSTOLIC BLOOD PRESSURE: 99 MMHG | OXYGEN SATURATION: 99 % | TEMPERATURE: 97 F

## 2020-09-09 DIAGNOSIS — R41.82 ALTERED MENTAL STATUS, UNSPECIFIED ALTERED MENTAL STATUS TYPE: Primary | ICD-10-CM

## 2020-09-09 PROCEDURE — 99215 OFFICE O/P EST HI 40 MIN: CPT | Performed by: NURSE PRACTITIONER

## 2020-09-09 NOTE — ED PROVIDER NOTES
Patient Seen in: [id number] [facility]      History   Patient presents with:  Altered Mental Status    Stated Complaint: lethargic non responsive /abdominal pain some runny stools    HPI    66-year-old female here with her father with a signifi • OTHER SURGICAL HISTORY      BILTERAL EYE SURGIERIES   • OTHER SURGICAL HISTORY      INTRATHECAL PUMP   • OTHER SURGICAL HISTORY      HAMSTRING LENGTHENING   • OTHER SURGICAL HISTORY      BILATERAL HIP SURGERY   • OTHER SURGICAL HISTORY      POSTERIOR S Normal pulses. Heart sounds: Normal heart sounds. Pulmonary:      Effort: Pulmonary effort is normal.      Breath sounds: Normal breath sounds. Abdominal:      General: Abdomen is flat. A surgical scar is present. Bowel sounds are decreased. her immediately rather than doing a partial work-up here. He verbalizes understanding. He tells me he will take her to [facility] emergency department.   Nursing staff to call the emergency department at [facility] to inform them of her imminent arrival

## 2020-09-09 NOTE — TELEPHONE ENCOUNTER
If Milan Quesada is not very responsive and lethargic this morning, I would recommend she be taken to the urgent care for evaluation.   Given her propensity for urinary tract infections and bowel obstruction as well as recent surgery she will likely need labs and p

## 2020-09-09 NOTE — ED INITIAL ASSESSMENT (HPI)
Father reports hx of surgery a month ago for a bowel obstruction. Was admitted for a week and has been doing well. Pt is normally verbal. Reyes Madden had a large bowel movement followed by loose stools.  Yesterday had some soup but this am dad states she is n

## 2020-09-09 NOTE — TELEPHONE ENCOUNTER
[facility] is asking if she should schedule a virtual visit? Or will you see her here?  Or immediate care for eval?

## 2020-09-14 ENCOUNTER — TELEPHONE (OUTPATIENT)
Dept: FAMILY MEDICINE CLINIC | Facility: CLINIC | Age: 30
End: 2020-09-14

## 2020-09-14 NOTE — TELEPHONE ENCOUNTER
He needs to give you the update on her from her [facility] visit last week for a uti.

## 2020-09-14 NOTE — TELEPHONE ENCOUNTER
Dad calls to give update----PLEASE SEE RECORDS IN Epic. Dad asks when to have pt see you for hospital f/u?? Do you want him to wait until all cultures are back?

## 2020-09-18 ENCOUNTER — OFFICE VISIT (OUTPATIENT)
Dept: FAMILY MEDICINE CLINIC | Facility: CLINIC | Age: 30
End: 2020-09-18
Payer: MEDICARE

## 2020-09-18 VITALS
BODY MASS INDEX: 17 KG/M2 | OXYGEN SATURATION: 98 % | WEIGHT: 98 LBS | DIASTOLIC BLOOD PRESSURE: 64 MMHG | SYSTOLIC BLOOD PRESSURE: 98 MMHG | TEMPERATURE: 98 F | HEART RATE: 102 BPM

## 2020-09-18 DIAGNOSIS — N30.00 ACUTE CYSTITIS WITHOUT HEMATURIA: Primary | ICD-10-CM

## 2020-09-18 DIAGNOSIS — R56.9 SEIZURES (HCC): ICD-10-CM

## 2020-09-18 DIAGNOSIS — J90 PLEURAL EFFUSION ON RIGHT: ICD-10-CM

## 2020-09-18 DIAGNOSIS — G80.0 SPASTIC QUADRIPLEGIC CEREBRAL PALSY (HCC): ICD-10-CM

## 2020-09-18 PROCEDURE — 99214 OFFICE O/P EST MOD 30 MIN: CPT | Performed by: FAMILY MEDICINE

## 2020-09-18 NOTE — PROGRESS NOTES
HPI:    Patient ID: Mag Pickering is a 34year old female.     Patient presents with:  ER F/U: [facility] UTI  Here with father  Patient was brought to [facility], ER on 9/9 with complaints of generalized abdominal pain, lethargy and significantly les HISTORY      INTRATHECAL PUMP   • OTHER SURGICAL HISTORY      HAMSTRING LENGTHENING   • OTHER SURGICAL HISTORY      BILATERAL HIP SURGERY   • OTHER SURGICAL HISTORY      POSTERIOR SPINAL FUSION FOR SCOLIOSIS   • OTHER SURGICAL HISTORY  2/2011  Chelsey Dumont nightly as needed for Allergies. • Fluticasone Propionate 50 MCG/ACT Nasal Suspension 2 sprays by Each Nare route daily. • valproic Acid 250 MG/5ML Oral Syrup by Per G Tube route 2 (two) times daily.  8 ml BID  600 mL 0   • Polyethylene Glycol 3350 • ibuprofen (MOTRIN) 100 MG/5ML Oral Suspension Take 10 mL by mouth every 6 (six) hours as needed.  (Patient not taking: Reported on 4/13/2020 ) 240 mL 0     Allergies:  Levonorgestrel          OTHER (SEE COMMENTS)    Comment:uknown  Vancomycin intervention. Continue all same medication and follow-up with specialists as scheduled    Praful Bee. DO Jaja, JOSSELYNFP    No orders of the defined types were placed in this encounter.       Meds This Visit:  Requested Prescriptions      No prescriptions r

## 2020-09-18 NOTE — PATIENT INSTRUCTIONS
Over 50% appointment was spent in education and counseling as well as reviewing hospital records with patient's father.   I explained in layman's terms implication of blood cultures, CT scan results, pleural effusion etc.  With regard to the pleural effusio

## 2020-09-24 ENCOUNTER — TELEPHONE (OUTPATIENT)
Dept: FAMILY MEDICINE CLINIC | Facility: CLINIC | Age: 30
End: 2020-09-24

## 2020-09-24 NOTE — TELEPHONE ENCOUNTER
DAD CALLED IN AND STATED NONI IS HAVING ISSUES AGAIN AND WANTED TO SEE IF DR Kelechi Tovar WOULD SEE HER TODAY. DAD MADE AWARE DR Kelechi Tovar IS NOT IN OFFICE AGAIN UNTIL Tuesday. HE STATED HE WILL TAKE HER TO ER FOR EVALUATION.

## 2020-10-03 ENCOUNTER — IMMUNIZATION (OUTPATIENT)
Dept: FAMILY MEDICINE CLINIC | Facility: CLINIC | Age: 30
End: 2020-10-03
Payer: MEDICARE

## 2020-10-03 DIAGNOSIS — Z23 NEED FOR VACCINATION: ICD-10-CM

## 2020-10-03 PROCEDURE — G0008 ADMIN INFLUENZA VIRUS VAC: HCPCS | Performed by: FAMILY MEDICINE

## 2020-10-03 PROCEDURE — 90686 IIV4 VACC NO PRSV 0.5 ML IM: CPT | Performed by: FAMILY MEDICINE

## 2020-10-14 ENCOUNTER — TELEPHONE (OUTPATIENT)
Dept: FAMILY MEDICINE CLINIC | Facility: CLINIC | Age: 30
End: 2020-10-14

## 2020-10-14 ENCOUNTER — APPOINTMENT (OUTPATIENT)
Dept: LAB | Facility: HOSPITAL | Age: 30
End: 2020-10-14
Attending: FAMILY MEDICINE
Payer: MEDICARE

## 2020-10-14 DIAGNOSIS — Z20.822 EXPOSURE TO COVID-19 VIRUS: Primary | ICD-10-CM

## 2020-10-14 DIAGNOSIS — Z20.822 EXPOSURE TO COVID-19 VIRUS: ICD-10-CM

## 2020-10-14 NOTE — TELEPHONE ENCOUNTER
Define \"presence\", significant exposure is greater than 15 minutes 10 feet without face covering. If you have been in close contact with someone who has COVID-19, you should be tested, even if you do not have symptoms of COVID-19.    If your test is nega

## 2020-10-14 NOTE — TELEPHONE ENCOUNTER
Spoke with pt's father. He states that pt was in the presence of a caretaker who tested positive for COVID this morning. Pt is currently having no symptoms.

## 2020-10-14 NOTE — TELEPHONE ENCOUNTER
Pt's father confirms that the pt has been in contact with caregiver without face coverage for more than 15 minutes. Can you place an order so she can be tested with father?

## 2020-10-30 ENCOUNTER — MED REC SCAN ONLY (OUTPATIENT)
Dept: FAMILY MEDICINE CLINIC | Facility: CLINIC | Age: 30
End: 2020-10-30

## 2020-11-09 NOTE — ED INITIAL ASSESSMENT (HPI)
Per dad, was told by pt's caregiver about possible uti sx starting. Pt incontinent, hx of frequent uti's.   Was able to get specimen last night and did an at home test 0 = independent

## 2020-11-16 RX ORDER — CLOTRIMAZOLE AND BETAMETHASONE DIPROPIONATE 10; .64 MG/G; MG/G
CREAM TOPICAL
Qty: 15 G | Refills: 1 | Status: SHIPPED | OUTPATIENT
Start: 2020-11-16 | End: 2021-11-29

## 2020-11-16 NOTE — TELEPHONE ENCOUNTER
Last refill: 8/30/19 #15 w/ 1 refill    Last OV: 9/18/20  Last labs: 2014 with us    Future Appointments   Date Time Provider Johanne Howard   12/16/2020  1:30 PM Sally Lake., DO [facility]

## 2020-12-16 ENCOUNTER — OFFICE VISIT (OUTPATIENT)
Dept: FAMILY MEDICINE CLINIC | Facility: CLINIC | Age: 30
End: 2020-12-16
Payer: MEDICARE

## 2020-12-16 VITALS
SYSTOLIC BLOOD PRESSURE: 106 MMHG | WEIGHT: 95 LBS | OXYGEN SATURATION: 98 % | HEART RATE: 84 BPM | TEMPERATURE: 97 F | DIASTOLIC BLOOD PRESSURE: 70 MMHG | BODY MASS INDEX: 17 KG/M2

## 2020-12-16 DIAGNOSIS — H57.13 EYE PAIN, BILATERAL: ICD-10-CM

## 2020-12-16 DIAGNOSIS — K59.09 CHRONIC CONSTIPATION: ICD-10-CM

## 2020-12-16 DIAGNOSIS — N31.9 NEUROGENIC BLADDER: ICD-10-CM

## 2020-12-16 DIAGNOSIS — Z00.00 ENCOUNTER FOR ANNUAL HEALTH EXAMINATION: Primary | ICD-10-CM

## 2020-12-16 DIAGNOSIS — H47.619 CORTICAL BLINDNESS, UNSPECIFIED LATERALITY: ICD-10-CM

## 2020-12-16 DIAGNOSIS — J30.89 NON-SEASONAL ALLERGIC RHINITIS, UNSPECIFIED TRIGGER: ICD-10-CM

## 2020-12-16 DIAGNOSIS — G80.0 SPASTIC QUADRIPLEGIC CEREBRAL PALSY (HCC): ICD-10-CM

## 2020-12-16 DIAGNOSIS — Z98.890 S/P EXPLORATORY LAPAROTOMY: ICD-10-CM

## 2020-12-16 DIAGNOSIS — R56.9 SEIZURES (HCC): ICD-10-CM

## 2020-12-16 DIAGNOSIS — D23.4: ICD-10-CM

## 2020-12-16 DIAGNOSIS — K21.9 GASTROESOPHAGEAL REFLUX DISEASE WITHOUT ESOPHAGITIS: ICD-10-CM

## 2020-12-16 DIAGNOSIS — M41.9 KYPHOSCOLIOSIS AND SCOLIOSIS: ICD-10-CM

## 2020-12-16 PROCEDURE — 99213 OFFICE O/P EST LOW 20 MIN: CPT | Performed by: FAMILY MEDICINE

## 2020-12-16 PROCEDURE — G0439 PPPS, SUBSEQ VISIT: HCPCS | Performed by: FAMILY MEDICINE

## 2020-12-16 NOTE — PATIENT INSTRUCTIONS
Kristen Freitas's SCREENING SCHEDULE   Tests on this list are recommended by your physician but may not be covered, or covered at this frequency, by your insurer. Please check with your insurance carrier before scheduling to verify coverage.    Mara Maldonado Update Health Maintenance if applicable    Flex Sigmoidoscopy Screen  Covered every 5 years No results found for this or any previous visit. No flowsheet data found.      Fecal Occult Blood   Covered Annually No results found for: FOB, OCCULTSTOOL No flowsh performed in visit on 10/03/12   • INFLUENZA VIRUS VACCINE, PRESERV FREE, >=1YEARS OF AGE    Please get every year    Pneumococcal 13 (Prevnar)  Covered Once after 65 No orders found for this or any previous visit.  Please get once after your 65th birthday Directives. 1. Encounter for annual health examination  I reviewed age-appropriate federal screening exams as well as advanced directives. Discussed age-appropriate physicians.   Immunizations are up-to-date, continue annual flu vaccines  I discussed the

## 2020-12-16 NOTE — H&P
HPI:   Roxana Acosta is a 27year old female Patient presents with:  Physical: Medicare AWV  pt has a sore spot on her scalp, hits it with her comb. Here w/ her father  Pt c/o eyes bother her , ?  Duration    Wt Readings from Last 6 Encounters:  12/16/ ([facility]) 5 MG/5ML Oral Syrup Take 10 mL by mouth daily. • Pseudoephedrine HCl 30 MG Oral Tab Take 30 mg by mouth 2 (two) times daily as needed for congestion.      • DiphenhydrAMINE HCl 12.5 MG/5ML Oral Liquid Take 25 mg by mouth night Oral Tab Take 1 mg by mouth 2 (two) times daily.          Levonorgestrel          OTHER (SEE COMMENTS)    Comment:uknown  Vancomycin              OTHER (SEE COMMENTS)    Comment:Scout syndrome  Seasonal                     Past Medical History:   Diagnosis general diet and will receive Ensure or boost via G-tube when appetite is poor, weight loss or illness decreases adequate caloric intake.  Patient will also receive free water via G-tube between's 1000 mL and 1500 mL daily to supplement oral intake  [employer]? yes  SAFETY: Does your home have throw rugs? no, Adequate lighting? yes, Grab bars in bathroom/shower/tub? yes,  Handrails on stairs?  yes, Some alarms? yes   Get and Go test > 12 seconds?  yes  EXAM:   /70   Pulse 84   Temp 97.3 °F (36.3 °C) unspecified laterality-bilateral  Kyphoscoliosis and scoliosis  Non-seasonal allergic rhinitis, unspecified trigger  S/p exploratory laparotomy w/ lysis of adhesions  No orders of the defined types were placed in this encounter.     Meds & Refills for this results found for this or any previous visit.  Limited to patients who meet one of the following criteria:   • Men who are 73-68 years old and have smoked more than 100 cigarettes in their lifetime   • Anyone with a family history    Colorectal Cancer Scree for this patient.  Please get this Mammogram regularly   Immunizations      Influenza  Covered Annually Orders placed or performed in visit on 11/30/16   • FLU VAC NO PRSV 4 SARAH 3 YRS+   Orders placed or performed in visit on 11/13/15   • [address] the [place]. This site has a lot of good information including definitions of the different types of Advance Directives.  It also has the State forms available on it's website for anyone to review and print using their home computer a w/ lysis of adhesions  Monitor clinically for recurrent obstruction    Saida Stephens.  Angelo Davila, FAAFP

## 2021-02-01 RX ORDER — MONTELUKAST SODIUM 10 MG/1
TABLET ORAL
Qty: 90 TABLET | Refills: 1 | Status: SHIPPED | OUTPATIENT
Start: 2021-02-01 | End: 2021-07-31

## 2021-03-31 ENCOUNTER — PATIENT MESSAGE (OUTPATIENT)
Dept: FAMILY MEDICINE CLINIC | Facility: CLINIC | Age: 31
End: 2021-03-31

## 2021-04-01 NOTE — TELEPHONE ENCOUNTER
From: Marlene Torres  To: Natividad Avendano.  Antonia Felipe DO  Sent: 3/31/2021 8:12 PM CDT  Subject: Other    This is Sonal's dad, Lonza Amend want to let you know that Josette Dayan got her her 1st dose of the Becki Evens on 3/24/21 through the [facility]

## 2021-04-19 RX ORDER — HONEY 100 %
1 PASTE (ML) TOPICAL 2 TIMES DAILY PRN
Qty: 1 TUBE | Refills: 1 | Status: SHIPPED | OUTPATIENT
Start: 2021-04-19

## 2021-07-23 ENCOUNTER — OFFICE VISIT (OUTPATIENT)
Dept: FAMILY MEDICINE CLINIC | Facility: CLINIC | Age: 31
End: 2021-07-23
Payer: MEDICARE

## 2021-07-23 VITALS — WEIGHT: 95 LBS | HEIGHT: 63 IN | BODY MASS INDEX: 16.83 KG/M2

## 2021-07-23 DIAGNOSIS — J02.9 PHARYNGITIS, UNSPECIFIED ETIOLOGY: Primary | ICD-10-CM

## 2021-07-23 LAB
CONTROL LINE PRESENT WITH A CLEAR BACKGROUND (YES/NO): YES YES/NO
KIT LOT #: NORMAL NUMERIC

## 2021-07-23 PROCEDURE — 87081 CULTURE SCREEN ONLY: CPT | Performed by: PHYSICIAN ASSISTANT

## 2021-07-23 PROCEDURE — 87880 STREP A ASSAY W/OPTIC: CPT | Performed by: PHYSICIAN ASSISTANT

## 2021-07-23 PROCEDURE — 99213 OFFICE O/P EST LOW 20 MIN: CPT | Performed by: PHYSICIAN ASSISTANT

## 2021-07-23 NOTE — PROGRESS NOTES
CHIEF COMPLAINT:   Patient presents with:  Sore Throat        HPI:   Anabel Woodruff is 27year old female presents to clinic with complaint of  sore throat. Symptoms 3 day(s). She is here with her father and care giver who provides the  History.   Me Pseudoephedrine HCl 30 MG Oral Tab Take 30 mg by mouth 2 (two) times daily as needed for congestion. • DiphenhydrAMINE HCl 12.5 MG/5ML Oral Liquid Take 25 mg by mouth nightly as needed for Allergies.      • Fluticasone Propionate 50 MCG/ACT Nasal Suspen Suspension Take 10 mL by mouth every 6 (six) hours as needed. (Patient not taking: Reported on 4/13/2020 ) 240 mL 0   • Ascorbic Acid (VITAMIN C) 500 MG Oral Tab Take 500 mg by mouth daily.  (Patient not taking: Reported on 7/23/2021 )     • Diazepam (DIAST lymphadenopathy.     Recent Results (from the past 24 hour(s))   STREP A ASSAY W/OPTIC    Collection Time: 07/23/21  6:01 PM   Result Value Ref Range    Strep Grp A Screen neg Negative    Control Line Present with a clear background (yes/no) yes Yes/No    K

## 2021-07-24 LAB — SARS-COV-2 RNA RESP QL NAA+PROBE: NOT DETECTED

## 2021-07-24 NOTE — PATIENT INSTRUCTIONS
Self-Care for Sore Throats  Sore throats happen for many reasons, such as colds, allergies, cigarette smoke, air pollution, and infections caused by viruses or bacteria. In any case, your throat becomes red and sore.  Your goal for self-care is to reduce allergy-causing substances, such as pollen and mold. · Wash your hands often when you’re around someone with a sore throat or cold. This will keep viruses or bacteria from spreading. · Limit outdoor time when air pollution is bad.   · Don’t strain your vo

## 2021-07-29 ENCOUNTER — PATIENT MESSAGE (OUTPATIENT)
Dept: FAMILY MEDICINE CLINIC | Facility: CLINIC | Age: 31
End: 2021-07-29

## 2021-07-30 NOTE — TELEPHONE ENCOUNTER
From: Chris Escalante  To: Rachel Wilkins.  Mike Rider DO  Sent: 7/29/2021 8:26 PM CDT  Subject: Other    Here are the attachements

## 2021-07-31 RX ORDER — MONTELUKAST SODIUM 10 MG/1
10 TABLET ORAL NIGHTLY
Qty: 90 TABLET | Refills: 1 | Status: SHIPPED | OUTPATIENT
Start: 2021-07-31 | End: 2022-01-26

## 2021-09-08 ENCOUNTER — OFFICE VISIT (OUTPATIENT)
Dept: FAMILY MEDICINE CLINIC | Facility: CLINIC | Age: 31
End: 2021-09-08
Payer: MEDICARE

## 2021-09-08 VITALS
HEART RATE: 86 BPM | SYSTOLIC BLOOD PRESSURE: 116 MMHG | BODY MASS INDEX: 16.83 KG/M2 | TEMPERATURE: 98 F | RESPIRATION RATE: 20 BRPM | WEIGHT: 95 LBS | DIASTOLIC BLOOD PRESSURE: 60 MMHG | HEIGHT: 63 IN | OXYGEN SATURATION: 97 %

## 2021-09-08 DIAGNOSIS — Z20.822 CLOSE EXPOSURE TO COVID-19 VIRUS: Primary | ICD-10-CM

## 2021-09-08 PROCEDURE — 99213 OFFICE O/P EST LOW 20 MIN: CPT | Performed by: NURSE PRACTITIONER

## 2021-09-08 NOTE — PATIENT INSTRUCTIONS
1. Rest. Drink plenty of fluids. 2. Supportive care as discussed. 3. Covid-19 testing sent to lab. Self quarantine at this time per CDC guidelines while awaiting test results. (If positive self quarantine should extend until at least 10 days from date o reducing the time they cannot work. A shorter quarantine period also can lessen stress on the public health system, especially when new infections are rapidly rising.   Your local public health authorities make the final decisions about how long quarantine contains at least 60% alcohol. 8. As much as possible, stay in a specific room and away from other people in your home. Also, you should use a separate bathroom, if available.  If you need to be around other people in or outside of the home, wear a facema positive for COVID-19, you should also stay home and away from others for a total of 10 days after your symptoms started, and at least 24 hours after your fever is gone and symptoms are getting better, whichever is longer.   Patients with pending COVID-19 t days*    *Some people will be required to have a repeat COVID-19 test in order to be eligible to donate. If you’re instructed by Estela Chen that a repeat test is required, please contact the [id number] COVID-19 Nurse Triage Line at [phone].     Addit condition to better understand if there are risk factors. How do I prevent a Post-COVID condition? The best way to prevent the long-term symptoms of COVID-19 is by preventing COVID-19.     Important ways to slow the spread of COVID 19 are:   • Get the

## 2021-09-08 NOTE — PROGRESS NOTES
CHIEF COMPLAINT:   Patient presents with:  Covid: Covid Test - Entered by patient      HPI:   Avelino Hogan is a 27year old female w/ hx of Cpwho presents with her father for covid-19 testing. Patient's father reports she is not having any symptoms or into the muscle every 3 (three) months. 1 mL 0   • Ferrous Gluconate 239 (27 Fe) MG Oral Tab Take 1 tablet by mouth daily. 1 tablet 0   • silver sulfADIAZINE 1 % External Cream Apply topically 2 (two) times daily as needed.      • Cetirizine HCl (ZYRTEC CHI (Patient not taking: Reported on 7/23/2021 )     • Glycerin, Laxative, (GLYCERIN, CHILD, RE) Place  rectally. • Baclofen 0.05 MG/ML Intrathecal Solution by Intrathecal route. • Diazepam (DIASTAT ACUDIAL) 10 MG Rectal Gel Place  rectally as needed. sore throat, nasal symptoms, or ear pain. LUNGS: denies cough, labored breathing, or wheezing. CARDIOVASCULAR: denies chest pain or palpitations   GI: denies N/V/C or abdominal pain  NEURO: Denies lethargy or abnormal behavior.     EXAM:   /60   Pul with the plan. Patient Instructions     1. Rest. Drink plenty of fluids. 2. Supportive care as discussed. 3. Covid-19 testing sent to lab. Self quarantine at this time per CDC guidelines while awaiting test results. (If positive self quarantine megan make it easier for people to quarantine by reducing the time they cannot work. A shorter quarantine period also can lessen stress on the public health system, especially when new infections are rapidly rising.   Your local public health authorities make the with an alcohol-based hand  that contains at least 60% alcohol. 8. As much as possible, stay in a specific room and away from other people in your home. Also, you should use a separate bathroom, if available.  If you need to be around other peopl someone with COVID-19 and have not tested positive for COVID-19, you should also stay home and away from others for a total of 10 days after your symptoms started, and at least 24 hours after your fever is gone and symptoms are getting better, whichever is Be symptom-free for at least 14 days*    *Some people will be required to have a repeat COVID-19 test in order to be eligible to donate.  If you’re instructed by Grzegorz Mistry that a repeat test is required, please contact the [facility] COVID-19 Nurse Hayley Grijalva people with a Post-COVID condition to better understand if there are risk factors. How do I prevent a Post-COVID condition? The best way to prevent the long-term symptoms of COVID-19 is by preventing COVID-19.     Important ways to slow the spread of C

## 2021-09-09 LAB — SARS-COV-2 RNA RESP QL NAA+PROBE: NOT DETECTED

## 2021-11-29 RX ORDER — CLOTRIMAZOLE AND BETAMETHASONE DIPROPIONATE 10; .64 MG/G; MG/G
CREAM TOPICAL
Qty: 15 G | Refills: 1 | Status: SHIPPED | OUTPATIENT
Start: 2021-11-29

## 2021-11-29 NOTE — TELEPHONE ENCOUNTER
LOV 12/16/2020      LAST RX 11/16/2020 15g 1 refill     Next OV   Future Appointments   Date Time Provider Joahnne Howard   12/7/2021 10:00 AM Celestine Nelson, DO [facility] [place]         PROTOCOL none

## 2021-12-07 ENCOUNTER — OFFICE VISIT (OUTPATIENT)
Dept: FAMILY MEDICINE CLINIC | Facility: CLINIC | Age: 31
End: 2021-12-07
Payer: MEDICARE

## 2021-12-07 ENCOUNTER — LAB ENCOUNTER (OUTPATIENT)
Dept: LAB | Age: 31
End: 2021-12-07
Attending: FAMILY MEDICINE
Payer: MEDICARE

## 2021-12-07 VITALS
SYSTOLIC BLOOD PRESSURE: 110 MMHG | BODY MASS INDEX: 18 KG/M2 | HEART RATE: 89 BPM | WEIGHT: 99 LBS | TEMPERATURE: 97 F | DIASTOLIC BLOOD PRESSURE: 60 MMHG | RESPIRATION RATE: 18 BRPM

## 2021-12-07 DIAGNOSIS — N31.9 NEUROGENIC BLADDER: ICD-10-CM

## 2021-12-07 DIAGNOSIS — Z23 NEED FOR VACCINATION: ICD-10-CM

## 2021-12-07 DIAGNOSIS — Z13.29 SCREENING FOR THYROID DISORDER: ICD-10-CM

## 2021-12-07 DIAGNOSIS — Z51.81 MEDICATION MONITORING ENCOUNTER: ICD-10-CM

## 2021-12-07 DIAGNOSIS — J30.89 NON-SEASONAL ALLERGIC RHINITIS, UNSPECIFIED TRIGGER: ICD-10-CM

## 2021-12-07 DIAGNOSIS — M41.9 KYPHOSCOLIOSIS AND SCOLIOSIS: ICD-10-CM

## 2021-12-07 DIAGNOSIS — R56.9 SEIZURES (HCC): ICD-10-CM

## 2021-12-07 DIAGNOSIS — Z00.00 ENCOUNTER FOR ANNUAL HEALTH EXAMINATION: Primary | ICD-10-CM

## 2021-12-07 DIAGNOSIS — K21.9 GASTROESOPHAGEAL REFLUX DISEASE WITHOUT ESOPHAGITIS: ICD-10-CM

## 2021-12-07 DIAGNOSIS — H47.619 CORTICAL BLINDNESS, UNSPECIFIED LATERALITY: ICD-10-CM

## 2021-12-07 DIAGNOSIS — G80.0 SPASTIC QUADRIPLEGIC CEREBRAL PALSY (HCC): ICD-10-CM

## 2021-12-07 DIAGNOSIS — K59.09 CHRONIC CONSTIPATION: ICD-10-CM

## 2021-12-07 PROCEDURE — G0008 ADMIN INFLUENZA VIRUS VAC: HCPCS | Performed by: FAMILY MEDICINE

## 2021-12-07 PROCEDURE — 99213 OFFICE O/P EST LOW 20 MIN: CPT | Performed by: FAMILY MEDICINE

## 2021-12-07 PROCEDURE — 80164 ASSAY DIPROPYLACETIC ACD TOT: CPT

## 2021-12-07 PROCEDURE — 84443 ASSAY THYROID STIM HORMONE: CPT

## 2021-12-07 PROCEDURE — G0439 PPPS, SUBSEQ VISIT: HCPCS | Performed by: FAMILY MEDICINE

## 2021-12-07 PROCEDURE — 80053 COMPREHEN METABOLIC PANEL: CPT

## 2021-12-07 PROCEDURE — 85025 COMPLETE CBC W/AUTO DIFF WBC: CPT

## 2021-12-07 PROCEDURE — G0444 DEPRESSION SCREEN ANNUAL: HCPCS | Performed by: FAMILY MEDICINE

## 2021-12-07 PROCEDURE — 36415 COLL VENOUS BLD VENIPUNCTURE: CPT

## 2021-12-07 PROCEDURE — 90686 IIV4 VACC NO PRSV 0.5 ML IM: CPT | Performed by: FAMILY MEDICINE

## 2021-12-07 PROCEDURE — 82306 VITAMIN D 25 HYDROXY: CPT

## 2021-12-07 RX ORDER — MEDROXYPROGESTERONE ACETATE 150 MG/ML
150 INJECTION, SUSPENSION INTRAMUSCULAR
COMMUNITY
Start: 2021-09-14

## 2021-12-07 RX ORDER — VALPROIC ACID 250 MG/5ML
400 SOLUTION ORAL 2 TIMES DAILY
COMMUNITY
Start: 2021-09-28

## 2021-12-07 NOTE — PATIENT INSTRUCTIONS
Ezequiel Freitas's SCREENING SCHEDULE   Tests on this list are recommended by your physician but may not be covered, or covered at this frequency, by your insurer. Please check with your insurance carrier before scheduling to verify coverage.    PREVEN normal risk;  Annually if at high risk -  Pap Smear,3 Years Never done    Chlamydia Annually if high risk -  No recommendations at this time   Screening Mammogram    Mammogram     Recommend annually for all female patients aged 36 and older    One baseline types of Advance Directives. It also has the State forms available on it's website for anyone to review and print using their home computer and printer. (the forms are also available in [address])  www. [email or web address] org  This link also has information from

## 2021-12-07 NOTE — H&P
HPI:   Nicholas Cervantes is a 32year old female who presents for a complete physical exam.  Patient concerns:   none.    Patient is here with her father and caregiver  Wt Readings from Last 6 Encounters:  12/07/21 : 99 lb (44.9 kg)  09/08/21 : 95 lb (43.1 daily as needed. • Cetirizine HCl 5 MG/5ML Oral Syrup Take 10 mL by mouth daily. • Pseudoephedrine HCl 30 MG Oral Tab Take 30 mg by mouth 2 (two) times daily as needed for congestion.      • DiphenhydrAMINE HCl 12.5 MG/5ML Oral Liquid Take 25 mg by (cerebral palsy), spastic, quadriplegic ([facility] [address]    • Esophageal reflux     [facility] Cal[facility]   • History of chicken pox    • [facility]     SEES DR Juliet Thornton      Past Surgical History:   Procedure Laterality Date   • BOWEL RESECTION May, Dr Noemy Ocasio- Urologist, Dr Savannah Hernandez, Dr Patricia Junior surgery     REVIEW OF SYSTEMS:   GENERAL: generally feels well, no unusual fatigue,no excessive daytime drowsiness, good appetite  SKIN: denies any skin breakdown or rashes, chronic intermitt alarms? yes   Get and Go test > 12 seconds?  yes  EXAM:   /60   Pulse 89   Temp 96.9 °F (36.1 °C) (Temporal)   Resp 18   Wt 99 lb (44.9 kg)   BMI 17.54 kg/m²   Body mass index is 17.54 kg/m².    GENERAL: well developed, well nourished,in no apparent d vaccination  Orders Placed This Encounter      TSH [E]      Comp Metabolic Panel (14) [E]      Vitamin D [E]      CBC W Differential W Platelet [E]      VALPROIC ACID [916] [Q]      FLULAVAL INFLUENZA VACCINE QUAD PRESERVATIVE FREE 0.5 ML    Meds & Refills recommendations at this time    Flexible Sigmoidoscopy   Covered every 4 years -    Fecal Occult Blood Test Covered annually -   Bone Density Screening    Bone density screening    Covered every 2 years after age 72 if diagnosed with risk of osteoporosis o Date    BUN 13 04/08/2020       Drug Serum Conc Annually No results found for: DIGOXIN, DIG, VALP              Recommended Websites for Advanced Directives    [email or web address]/[email or web address]_[email or web address]. pdf  An information packet, including Destiney Novoa Future  - VITAMIN D; Future  - CBC WITH DIFFERENTIAL WITH PLATELET; Future  - VALPROIC ACID, (DEPAKENE); Future    11. Screening for thyroid disorder    - ASSAY, THYROID STIM HORMONE; Future    12. Need for vaccination  Reviewed age-appropriate mutations.

## 2022-01-26 RX ORDER — MONTELUKAST SODIUM 10 MG/1
10 TABLET ORAL NIGHTLY
Qty: 90 TABLET | Refills: 1 | Status: SHIPPED | OUTPATIENT
Start: 2022-01-26

## 2022-06-29 ENCOUNTER — TELEPHONE (OUTPATIENT)
Dept: FAMILY MEDICINE CLINIC | Facility: CLINIC | Age: 32
End: 2022-06-29

## 2022-06-29 NOTE — TELEPHONE ENCOUNTER
Spoke to patient's father. Advised that [facility] is requesting order and last office notes. Per patient's father - ok to send last office note to [facility].     Order and office note of 12/7/21 faxed to [facility] at [phone]

## 2022-07-27 RX ORDER — MONTELUKAST SODIUM 10 MG/1
TABLET ORAL
Qty: 90 TABLET | Refills: 1 | Status: SHIPPED | OUTPATIENT
Start: 2022-07-27

## 2022-08-24 ENCOUNTER — MED REC SCAN ONLY (OUTPATIENT)
Dept: FAMILY MEDICINE CLINIC | Facility: CLINIC | Age: 32
End: 2022-08-24

## 2022-08-24 ENCOUNTER — OFFICE VISIT (OUTPATIENT)
Dept: FAMILY MEDICINE CLINIC | Facility: CLINIC | Age: 32
End: 2022-08-24
Payer: MEDICARE

## 2022-08-24 VITALS
BODY MASS INDEX: 17 KG/M2 | TEMPERATURE: 98 F | RESPIRATION RATE: 18 BRPM | HEART RATE: 100 BPM | WEIGHT: 98 LBS | OXYGEN SATURATION: 98 % | SYSTOLIC BLOOD PRESSURE: 116 MMHG | DIASTOLIC BLOOD PRESSURE: 72 MMHG

## 2022-08-24 DIAGNOSIS — H47.619 CORTICAL BLINDNESS, UNSPECIFIED LATERALITY: ICD-10-CM

## 2022-08-24 DIAGNOSIS — G80.0 SPASTIC QUADRIPLEGIC CEREBRAL PALSY (HCC): ICD-10-CM

## 2022-08-24 DIAGNOSIS — J30.89 NON-SEASONAL ALLERGIC RHINITIS, UNSPECIFIED TRIGGER: ICD-10-CM

## 2022-08-24 DIAGNOSIS — M41.9 KYPHOSCOLIOSIS AND SCOLIOSIS: ICD-10-CM

## 2022-08-24 DIAGNOSIS — K21.9 GASTROESOPHAGEAL REFLUX DISEASE WITHOUT ESOPHAGITIS: ICD-10-CM

## 2022-08-24 DIAGNOSIS — R56.9 SEIZURES (HCC): Primary | ICD-10-CM

## 2022-08-24 DIAGNOSIS — N31.9 NEUROGENIC BLADDER: ICD-10-CM

## 2022-08-24 DIAGNOSIS — H61.91 LESION OF RIGHT EXTERNAL EAR: ICD-10-CM

## 2022-08-24 DIAGNOSIS — Z51.81 MEDICATION MONITORING ENCOUNTER: ICD-10-CM

## 2022-08-24 DIAGNOSIS — K59.09 CHRONIC CONSTIPATION: ICD-10-CM

## 2022-08-24 PROCEDURE — 99214 OFFICE O/P EST MOD 30 MIN: CPT | Performed by: FAMILY MEDICINE

## 2022-08-24 RX ORDER — MIDAZOLAM 5 MG/.1ML
5 SPRAY NASAL AS NEEDED
COMMUNITY
Start: 2022-05-18

## 2022-08-24 NOTE — PATIENT INSTRUCTIONS
I reviewed medications and recent consultant notes  Discussed skin care, recommend routine use of barrier cream to right ear lesion, patient may benefit from a cushion doughnut to relieve direct pressure on the ear. Monitor clinically  Reviewed bowel regimen.   Continue enteral water as well as parenteral feedings, choking precautions reviewed  Reviewed age-appropriate immunizations  Continue Depo Provera, will discuss future Pap smears given challenges to the exam  Follow-up 6 months

## 2022-11-21 ENCOUNTER — TELEPHONE (OUTPATIENT)
Dept: FAMILY MEDICINE CLINIC | Facility: CLINIC | Age: 32
End: 2022-11-21

## 2022-11-21 NOTE — TELEPHONE ENCOUNTER
NEED TO KNOW WHAT GYNE DR Nick Mccloud WANTS HER TO SEE SINCE DR HSU IS RETIRING? ALSO DEPO IS READY @ PHARMACY FOR , CAN SHE GET NURSE APPT TO GET SHOT DONE HERE?  SHE USED TO GET THIS DONE @ DR Janette Hendricks OFFICE, CALL DAD BACK

## 2022-11-21 NOTE — TELEPHONE ENCOUNTER
The other part of dad's question is can we given her Depo shot that is due now? (until she can get established with new gyne) If ordered by Dr. Frantz Hoffmann? As a nurse appt?

## 2022-11-23 ENCOUNTER — NURSE ONLY (OUTPATIENT)
Dept: FAMILY MEDICINE CLINIC | Facility: CLINIC | Age: 32
End: 2022-11-23
Payer: MEDICARE

## 2022-11-23 DIAGNOSIS — Z30.42 DEPO-PROVERA CONTRACEPTIVE STATUS: Primary | ICD-10-CM

## 2022-11-23 LAB
CONTROL LINE PRESENT WITH A CLEAR BACKGROUND (YES/NO): YES YES/NO
PREGNANCY TEST, URINE: NEGATIVE

## 2022-11-23 PROCEDURE — 96372 THER/PROPH/DIAG INJ SC/IM: CPT | Performed by: FAMILY MEDICINE

## 2022-11-23 RX ORDER — MEDROXYPROGESTERONE ACETATE 150 MG/ML
150 INJECTION, SUSPENSION INTRAMUSCULAR ONCE
Status: COMPLETED | OUTPATIENT
Start: 2022-11-23 | End: 2022-11-23

## 2022-11-23 RX ADMIN — MEDROXYPROGESTERONE ACETATE 150 MG: 150 INJECTION, SUSPENSION INTRAMUSCULAR at 11:20:00

## 2022-11-23 NOTE — PROGRESS NOTES
Pt in for Depo injection. Pt is in a wheelchair - accompanied by father. Pt has had previous Depo injections per Dr. Cintia Mobley - last one Aug 3rd  Urine pregnancy test completed per protocol. Father brought in urine sample. Urine preg results negative. Pt requested injection given in right deltoid. Tolerated well.

## 2022-12-12 RX ORDER — CLOTRIMAZOLE AND BETAMETHASONE DIPROPIONATE 10; .64 MG/G; MG/G
CREAM TOPICAL
Qty: 15 G | Refills: 1 | Status: SHIPPED | OUTPATIENT
Start: 2022-12-12

## 2023-01-30 ENCOUNTER — OFFICE VISIT (OUTPATIENT)
Dept: FAMILY MEDICINE CLINIC | Facility: CLINIC | Age: 33
End: 2023-01-30
Payer: MEDICARE

## 2023-01-30 VITALS
OXYGEN SATURATION: 97 % | HEART RATE: 99 BPM | SYSTOLIC BLOOD PRESSURE: 110 MMHG | TEMPERATURE: 99 F | DIASTOLIC BLOOD PRESSURE: 60 MMHG | WEIGHT: 102 LBS | BODY MASS INDEX: 18 KG/M2

## 2023-01-30 DIAGNOSIS — M41.9 KYPHOSCOLIOSIS AND SCOLIOSIS: ICD-10-CM

## 2023-01-30 DIAGNOSIS — J30.89 NON-SEASONAL ALLERGIC RHINITIS, UNSPECIFIED TRIGGER: ICD-10-CM

## 2023-01-30 DIAGNOSIS — Z23 NEED FOR VACCINATION: ICD-10-CM

## 2023-01-30 DIAGNOSIS — B35.6 TINEA CRURIS: ICD-10-CM

## 2023-01-30 DIAGNOSIS — R56.9 SEIZURES (HCC): ICD-10-CM

## 2023-01-30 DIAGNOSIS — H47.619 CORTICAL BLINDNESS, UNSPECIFIED LATERALITY: ICD-10-CM

## 2023-01-30 DIAGNOSIS — Z00.00 ENCOUNTER FOR ANNUAL HEALTH EXAMINATION: Primary | ICD-10-CM

## 2023-01-30 DIAGNOSIS — G80.0 SPASTIC QUADRIPLEGIC CEREBRAL PALSY (HCC): ICD-10-CM

## 2023-01-30 DIAGNOSIS — K59.09 CHRONIC CONSTIPATION: ICD-10-CM

## 2023-01-30 DIAGNOSIS — K21.9 GASTROESOPHAGEAL REFLUX DISEASE WITHOUT ESOPHAGITIS: ICD-10-CM

## 2023-01-30 DIAGNOSIS — N31.9 NEUROGENIC BLADDER: ICD-10-CM

## 2023-02-15 ENCOUNTER — OFFICE VISIT (OUTPATIENT)
Dept: OBGYN CLINIC | Facility: CLINIC | Age: 33
End: 2023-02-15
Payer: MEDICARE

## 2023-02-15 VITALS
BODY MASS INDEX: 19 KG/M2 | SYSTOLIC BLOOD PRESSURE: 114 MMHG | HEART RATE: 101 BPM | DIASTOLIC BLOOD PRESSURE: 68 MMHG | WEIGHT: 105 LBS

## 2023-02-15 DIAGNOSIS — Z30.42 ENCOUNTER FOR SURVEILLANCE OF INJECTABLE CONTRACEPTIVE: Primary | ICD-10-CM

## 2023-02-15 RX ORDER — MEDROXYPROGESTERONE ACETATE 150 MG/ML
150 INJECTION, SUSPENSION INTRAMUSCULAR ONCE
Status: COMPLETED | OUTPATIENT
Start: 2023-02-15 | End: 2023-02-15

## 2023-02-15 RX ADMIN — MEDROXYPROGESTERONE ACETATE 150 MG: 150 INJECTION, SUSPENSION INTRAMUSCULAR at 14:18:00

## 2023-02-15 NOTE — PROGRESS NOTES
Subjective:  28year old    Patient presents with: Other: Pt wants Depo    Pt here today, with her caregiver, requesting depo injection  Pt has been getting depo injection for over 5 years at [facility] but this has since closed  This has stopped her menses  No other complaints    Last pap over 5 years ago  Pt is not sexually active    Review of Systems:  Pertinent items are noted in the HPI. Objective:  /68 (BP Location: Left arm, Patient Position: Sitting, Cuff Size: adult)   Pulse 101   Wt 105 lb (47.6 kg)     Physical Examination:  General appearance: Well dressed, well nourished in no apparent distress  Neurologic/Psychiatric: able to answer yes/no questions, appearance is neat and smiling affect    Assessment/Plan:    Diagnoses and all orders for this visit:    Encounter for surveillance of injectable contraceptive      - medroxyPROGESTERone (Depo-Provera) 150 MG/ML injection 150 mg  - needs depo injection every 3 months      Return in about 3 months (around 5/15/2023) for RN visit - bianca.

## 2023-03-08 ENCOUNTER — OFFICE VISIT (OUTPATIENT)
Dept: FAMILY MEDICINE CLINIC | Facility: CLINIC | Age: 33
End: 2023-03-08
Payer: MEDICARE

## 2023-03-08 VITALS
BODY MASS INDEX: 18 KG/M2 | DIASTOLIC BLOOD PRESSURE: 72 MMHG | HEART RATE: 108 BPM | OXYGEN SATURATION: 96 % | TEMPERATURE: 98 F | WEIGHT: 100 LBS | SYSTOLIC BLOOD PRESSURE: 100 MMHG

## 2023-03-08 DIAGNOSIS — J02.9 SORE THROAT: Primary | ICD-10-CM

## 2023-03-08 DIAGNOSIS — J30.89 NON-SEASONAL ALLERGIC RHINITIS, UNSPECIFIED TRIGGER: ICD-10-CM

## 2023-03-08 DIAGNOSIS — K21.9 LARYNGOPHARYNGEAL REFLUX (LPR): ICD-10-CM

## 2023-03-08 PROCEDURE — 99213 OFFICE O/P EST LOW 20 MIN: CPT | Performed by: FAMILY MEDICINE

## 2023-03-08 RX ORDER — SUCRALFATE 1 G/1
1 TABLET ORAL
Qty: 40 TABLET | Refills: 1 | Status: SHIPPED
Start: 2023-03-08

## 2023-03-08 NOTE — PATIENT INSTRUCTIONS
I discussed possible diagnoses and contributing factors. I agree with aggressively beginning antiallergy regimen. I suspect her sore throat may be related to laryngeal pharyngeal reflux. I have asked father to allow at least 2-1/2 to 3 hours between meals and change of position to supine to prevent reflux  Trial of sucralfate suspension 1 g before meals and at bedtime. Of asked father to call with an update over the next 1 to 2 weeks.

## 2023-05-10 ENCOUNTER — NURSE ONLY (OUTPATIENT)
Dept: OBGYN CLINIC | Facility: CLINIC | Age: 33
End: 2023-05-10
Payer: MEDICARE

## 2023-05-10 VITALS — DIASTOLIC BLOOD PRESSURE: 74 MMHG | HEART RATE: 97 BPM | SYSTOLIC BLOOD PRESSURE: 116 MMHG

## 2023-05-10 DIAGNOSIS — Z30.42 ENCOUNTER FOR SURVEILLANCE OF INJECTABLE CONTRACEPTIVE: Primary | ICD-10-CM

## 2023-05-10 PROCEDURE — 96372 THER/PROPH/DIAG INJ SC/IM: CPT | Performed by: NURSE PRACTITIONER

## 2023-05-10 RX ORDER — MEDROXYPROGESTERONE ACETATE 150 MG/ML
150 INJECTION, SUSPENSION INTRAMUSCULAR
Status: SHIPPED | OUTPATIENT
Start: 2023-05-10 | End: 2024-05-04

## 2023-05-10 RX ADMIN — MEDROXYPROGESTERONE ACETATE 150 MG: 150 INJECTION, SUSPENSION INTRAMUSCULAR at 14:31:00

## 2023-07-25 ENCOUNTER — NURSE ONLY (OUTPATIENT)
Dept: OBGYN CLINIC | Facility: CLINIC | Age: 33
End: 2023-07-25
Payer: MEDICARE

## 2023-07-25 VITALS — BODY MASS INDEX: 19 KG/M2 | WEIGHT: 106 LBS

## 2023-07-25 PROCEDURE — 96372 THER/PROPH/DIAG INJ SC/IM: CPT | Performed by: NURSE PRACTITIONER

## 2023-07-25 RX ADMIN — MEDROXYPROGESTERONE ACETATE 150 MG: 150 INJECTION, SUSPENSION INTRAMUSCULAR at 14:20:00

## 2023-07-25 NOTE — PROGRESS NOTES
Pt given depo in right deltoid. PT tolerated well. Pt knows to come back within 10/10-10/24 for next injection.

## 2023-07-26 ENCOUNTER — OFFICE VISIT (OUTPATIENT)
Dept: FAMILY MEDICINE CLINIC | Facility: CLINIC | Age: 33
End: 2023-07-26
Payer: MEDICARE

## 2023-07-26 VITALS
HEART RATE: 92 BPM | TEMPERATURE: 98 F | DIASTOLIC BLOOD PRESSURE: 70 MMHG | WEIGHT: 106 LBS | OXYGEN SATURATION: 98 % | RESPIRATION RATE: 16 BRPM | SYSTOLIC BLOOD PRESSURE: 110 MMHG | BODY MASS INDEX: 19 KG/M2

## 2023-07-26 DIAGNOSIS — K59.09 CHRONIC CONSTIPATION: ICD-10-CM

## 2023-07-26 DIAGNOSIS — N31.9 NEUROGENIC BLADDER: ICD-10-CM

## 2023-07-26 DIAGNOSIS — Z51.81 MEDICATION MONITORING ENCOUNTER: ICD-10-CM

## 2023-07-26 DIAGNOSIS — R56.9 SEIZURES (HCC): ICD-10-CM

## 2023-07-26 DIAGNOSIS — H47.619 CORTICAL BLINDNESS, UNSPECIFIED LATERALITY: ICD-10-CM

## 2023-07-26 DIAGNOSIS — K21.9 LARYNGOPHARYNGEAL REFLUX (LPR): ICD-10-CM

## 2023-07-26 DIAGNOSIS — G80.0 SPASTIC QUADRIPLEGIC CEREBRAL PALSY (HCC): Primary | ICD-10-CM

## 2023-07-26 DIAGNOSIS — J30.89 NON-SEASONAL ALLERGIC RHINITIS, UNSPECIFIED TRIGGER: ICD-10-CM

## 2023-07-26 DIAGNOSIS — K21.9 GASTROESOPHAGEAL REFLUX DISEASE WITHOUT ESOPHAGITIS: ICD-10-CM

## 2023-07-26 PROCEDURE — 99214 OFFICE O/P EST MOD 30 MIN: CPT | Performed by: FAMILY MEDICINE

## 2023-07-26 NOTE — PATIENT INSTRUCTIONS
I reviewed age-appropriate preventative health screening exams. I discussed supportive care measures.   Reviewed skin care  Follow-up with dentist as scheduled  Follow-up in 6 months for wellness visit  I reviewed most recent neurology appointment  Continue antireflux measures  Reviewed management strategies for constipation  No medication changes or labs indicated today

## 2023-08-01 RX ORDER — CLOTRIMAZOLE AND BETAMETHASONE DIPROPIONATE 10; .64 MG/G; MG/G
CREAM TOPICAL
Qty: 15 G | Refills: 1 | Status: SHIPPED | OUTPATIENT
Start: 2023-08-01

## 2023-08-01 NOTE — TELEPHONE ENCOUNTER
CLOTRIMAZOLE-BETAMETHASONE 1-0.05 % External Cream     Last office visit:  1/30/23    Future Appointments   Date Time Provider Johanne Howard   10/10/2023  2:30 PM [facility] NURSE [facility]   Last filled:  12/12/22  15 g with 1 refills   Last labs:

## 2023-10-10 ENCOUNTER — NURSE ONLY (OUTPATIENT)
Dept: OBGYN CLINIC | Facility: CLINIC | Age: 33
End: 2023-10-10
Payer: MEDICARE

## 2023-10-10 VITALS
DIASTOLIC BLOOD PRESSURE: 68 MMHG | WEIGHT: 106 LBS | BODY MASS INDEX: 19 KG/M2 | HEART RATE: 99 BPM | SYSTOLIC BLOOD PRESSURE: 112 MMHG

## 2023-10-10 PROCEDURE — 96372 THER/PROPH/DIAG INJ SC/IM: CPT | Performed by: NURSE PRACTITIONER

## 2023-10-10 RX ORDER — AMOXICILLIN 250 MG/5ML
POWDER, FOR SUSPENSION ORAL
COMMUNITY
Start: 2023-10-05

## 2023-10-10 RX ORDER — ACETAMINOPHEN AND CODEINE PHOSPHATE 120; 12 MG/5ML; MG/5ML
SOLUTION ORAL
COMMUNITY
Start: 2023-08-01

## 2023-10-10 RX ADMIN — MEDROXYPROGESTERONE ACETATE 150 MG: 150 INJECTION, SUSPENSION INTRAMUSCULAR at 14:30:00

## 2023-10-23 ENCOUNTER — TELEPHONE (OUTPATIENT)
Dept: FAMILY MEDICINE CLINIC | Facility: CLINIC | Age: 33
End: 2023-10-23

## 2023-10-23 DIAGNOSIS — R30.0 DYSURIA: Primary | ICD-10-CM

## 2023-10-23 RX ORDER — SULFAMETHOXAZOLE AND TRIMETHOPRIM 800; 160 MG/1; MG/1
1 TABLET ORAL 2 TIMES DAILY
Qty: 10 TABLET | Refills: 0 | Status: SHIPPED | OUTPATIENT
Start: 2023-10-23 | End: 2023-10-28

## 2023-10-23 NOTE — TELEPHONE ENCOUNTER
Pt's dad advised of below, verbalizes understanding. Lab order placed and Bactrim DS script sent to [facility].

## 2023-10-23 NOTE — TELEPHONE ENCOUNTER
OT URINE TEST SHOWED POSITIVE FOR UTI BUT HE SAID ITS VERY HARD TO GET A GOOD READING SINCE SHE WEARS BRIEFS. HE HAS A FEW QUESTIONS FOR THE NURSE.

## 2023-10-23 NOTE — TELEPHONE ENCOUNTER
Spoke with pt's dad. States pt had a decreased appetite yesterday. Then since this morning she has been complaining of pain with urination. Dad bought some OTC test strips and was able to get a small amount of urine on it and \"it was positive\". Dad asks if pt can be treated without taking her to the IC for a cath? States he thinks he can get a sample if her puts her in her shower chair. If so, can he take it to the [facility] reference lab? Or bring it here? He did give her OTC Azo for some relief, so urine will now be discolored.

## 2023-10-23 NOTE — TELEPHONE ENCOUNTER
If patient's father is able to obtain a reasonable urine specimen for culture, we can start Bactrim DS twice daily for 5 days after specimen is obtained.   He may bring it to the [facility] reference lab

## 2023-10-24 ENCOUNTER — LAB ENCOUNTER (OUTPATIENT)
Dept: LAB | Age: 33
End: 2023-10-24
Attending: FAMILY MEDICINE

## 2023-10-24 DIAGNOSIS — R30.0 DYSURIA: ICD-10-CM

## 2023-10-24 PROCEDURE — 87086 URINE CULTURE/COLONY COUNT: CPT

## 2023-10-24 PROCEDURE — 87077 CULTURE AEROBIC IDENTIFY: CPT

## 2023-10-24 PROCEDURE — 87186 SC STD MICRODIL/AGAR DIL: CPT

## 2023-10-26 RX ORDER — CEPHALEXIN 500 MG/1
500 CAPSULE ORAL 3 TIMES DAILY
Qty: 15 CAPSULE | Refills: 0 | Status: SHIPPED | OUTPATIENT
Start: 2023-10-26 | End: 2023-10-31

## 2023-11-01 ENCOUNTER — TELEPHONE (OUTPATIENT)
Dept: FAMILY MEDICINE CLINIC | Facility: CLINIC | Age: 33
End: 2023-11-01

## 2023-11-01 RX ORDER — CEPHALEXIN 500 MG/1
500 CAPSULE ORAL 3 TIMES DAILY
Qty: 15 CAPSULE | Refills: 0 | Status: SHIPPED | OUTPATIENT
Start: 2023-11-01 | End: 2023-11-06

## 2023-11-01 NOTE — TELEPHONE ENCOUNTER
Confirm that patient finished cephalexin prescription and not Bactrim.   If correct, would add an additional 5 days of cephalexin 500 mg 3 times daily and then recheck urine culture at least 48 hours after completion of therapy, increase fluid intake

## 2023-11-01 NOTE — TELEPHONE ENCOUNTER
FINISHED MEDS FOR UTI YESTERDAY, STILL HAVING SYMPTOMS, DOES DAD NEED TO BRNG URINE SAMPLE OVER?  CALL HIM BACK

## 2023-11-01 NOTE — TELEPHONE ENCOUNTER
PC to father. Spoke with pt's father and advised of Dr. Raymond Shah comments/recommendations. Pt's father confirms pt finished the script for cephalexin and not bactrim.      New script sent to [facility] in [address] [place] [address]

## 2023-11-10 ENCOUNTER — LABORATORY ENCOUNTER (OUTPATIENT)
Dept: LAB | Age: 33
End: 2023-11-10
Attending: FAMILY MEDICINE
Payer: MEDICARE

## 2023-11-10 ENCOUNTER — TELEPHONE (OUTPATIENT)
Dept: FAMILY MEDICINE CLINIC | Facility: CLINIC | Age: 33
End: 2023-11-10

## 2023-11-10 DIAGNOSIS — R30.0 DYSURIA: ICD-10-CM

## 2023-11-10 DIAGNOSIS — N39.0 URINARY TRACT INFECTION WITHOUT HEMATURIA, SITE UNSPECIFIED: Primary | ICD-10-CM

## 2023-11-10 PROCEDURE — 87086 URINE CULTURE/COLONY COUNT: CPT

## 2023-11-10 PROCEDURE — 87088 URINE BACTERIA CULTURE: CPT

## 2023-11-10 PROCEDURE — 87186 SC STD MICRODIL/AGAR DIL: CPT

## 2023-11-10 NOTE — TELEPHONE ENCOUNTER
Spoke with dad and he states she completed the antibiotic on Tuesday, so it has been at least 48 hours. Order placed for urine culture.

## 2023-11-10 NOTE — TELEPHONE ENCOUNTER
Dad will try & drop of urine sample today. He was advised to bring in urine sample when she finished the antibiotic.

## 2023-11-13 ENCOUNTER — PATIENT MESSAGE (OUTPATIENT)
Dept: FAMILY MEDICINE CLINIC | Facility: CLINIC | Age: 33
End: 2023-11-13

## 2023-11-13 ENCOUNTER — TELEPHONE (OUTPATIENT)
Dept: FAMILY MEDICINE CLINIC | Facility: CLINIC | Age: 33
End: 2023-11-13

## 2023-11-13 DIAGNOSIS — R30.0 DYSURIA: ICD-10-CM

## 2023-11-13 DIAGNOSIS — N39.0 URINARY TRACT INFECTION WITHOUT HEMATURIA, SITE UNSPECIFIED: Primary | ICD-10-CM

## 2023-11-13 RX ORDER — NITROFURANTOIN 25; 75 MG/1; MG/1
100 CAPSULE ORAL 2 TIMES DAILY
Qty: 14 CAPSULE | Refills: 0 | Status: SHIPPED | OUTPATIENT
Start: 2023-11-13 | End: 2023-11-20

## 2023-11-13 NOTE — TELEPHONE ENCOUNTER
I would advise patient's father that we do not have appropriate staff in our office to obtain a cath specimen for urine.   He should contact the immediate care to see if they will collect a posttreatment specimen

## 2023-11-13 NOTE — TELEPHONE ENCOUNTER
DR. Shayy Blevins SEE THE [facility] SERVICES MESSAGE RE: THIS!

## 2023-11-13 NOTE — TELEPHONE ENCOUNTER
See dad's question about a Ngo for he repeat urine C&S. I was not comfortable at all trying to do it in our office a year ago.   She has gone to the [address] [place] [address]  for UTI sx and she had a Ngo done there to collect specimen for initial C&S, but I don't think they will do one for just the repeat C&S

## 2023-11-13 NOTE — TELEPHONE ENCOUNTER
Spoke with pt's dad. States that the nitrofurantoin came as a capsule and inside the capsule is a powder and a small red pill? Dad states most meds given through pt's G-tube, after crushing or opening capsule and mixing with water. Dad asks if okay to do that because it has powder and a pill inside the capsule. Advised pt's dad to call and speak with the pharmacist re: this. Also advised him that if the pharmacy needs us to send it in a different formulation, to let us know.

## 2023-11-13 NOTE — TELEPHONE ENCOUNTER
From: Cardell Essex  To: Nicole Flores  Sent: 11/13/2023 7:54 AM CST  Subject: UTI    I an not sure if you want us to Repeat the urine culture before she starts the nitrofurantoin or only after?   Thank you  Sonal's father, Romyjonathan Noel

## 2023-11-13 NOTE — TELEPHONE ENCOUNTER
DR. Estelle Cheadle THE PHONE NOTE RE: THIS AND THEN DAD'S MESSAGE BELOW (HE COULDN'T CALL US BACK TO LET US KNOW WHAT THE PHARMACIST SAID ABOUT THIS BECAUSE THE PHONES WERE OFF)

## 2023-11-14 RX ORDER — NITROFURANTOIN MACROCRYSTALS 100 MG/1
100 CAPSULE ORAL 4 TIMES DAILY
Qty: 28 CAPSULE | Refills: 0 | Status: SHIPPED | OUTPATIENT
Start: 2023-11-14 | End: 2023-11-21

## 2023-11-27 ENCOUNTER — TELEPHONE (OUTPATIENT)
Dept: FAMILY MEDICINE CLINIC | Facility: CLINIC | Age: 33
End: 2023-11-27

## 2023-11-27 ENCOUNTER — NURSE ONLY (OUTPATIENT)
Dept: FAMILY MEDICINE CLINIC | Facility: CLINIC | Age: 33
End: 2023-11-27
Payer: MEDICARE

## 2023-11-27 DIAGNOSIS — N39.0 URINARY TRACT INFECTION WITHOUT HEMATURIA, SITE UNSPECIFIED: ICD-10-CM

## 2023-11-27 DIAGNOSIS — R30.0 DYSURIA: ICD-10-CM

## 2023-11-27 PROCEDURE — 87186 SC STD MICRODIL/AGAR DIL: CPT | Performed by: FAMILY MEDICINE

## 2023-11-27 PROCEDURE — 87086 URINE CULTURE/COLONY COUNT: CPT | Performed by: FAMILY MEDICINE

## 2023-11-27 PROCEDURE — 87088 URINE BACTERIA CULTURE: CPT | Performed by: FAMILY MEDICINE

## 2023-11-27 NOTE — TELEPHONE ENCOUNTER
Dad said Dr. Antonio Burgos wanted a urine sample when she was done with her antibiotics. Dad just collected one, can he still bring by today?

## 2023-11-30 RX ORDER — NITROFURANTOIN MACROCRYSTALS 100 MG/1
100 CAPSULE ORAL 4 TIMES DAILY
Qty: 28 CAPSULE | Refills: 0 | Status: SHIPPED | OUTPATIENT
Start: 2023-11-30 | End: 2023-12-07

## 2023-12-11 ENCOUNTER — LABORATORY ENCOUNTER (OUTPATIENT)
Dept: LAB | Age: 33
End: 2023-12-11
Attending: FAMILY MEDICINE
Payer: MEDICARE

## 2023-12-11 ENCOUNTER — PATIENT MESSAGE (OUTPATIENT)
Dept: FAMILY MEDICINE CLINIC | Facility: CLINIC | Age: 33
End: 2023-12-11

## 2023-12-11 DIAGNOSIS — N39.0 URINARY TRACT INFECTION WITHOUT HEMATURIA, SITE UNSPECIFIED: ICD-10-CM

## 2023-12-11 DIAGNOSIS — N39.0 URINARY TRACT INFECTION WITHOUT HEMATURIA, SITE UNSPECIFIED: Primary | ICD-10-CM

## 2023-12-11 DIAGNOSIS — N39.0 RECURRENT UTI: ICD-10-CM

## 2023-12-11 PROCEDURE — 87077 CULTURE AEROBIC IDENTIFY: CPT

## 2023-12-11 PROCEDURE — 87186 SC STD MICRODIL/AGAR DIL: CPT

## 2023-12-11 PROCEDURE — 87086 URINE CULTURE/COLONY COUNT: CPT

## 2023-12-11 NOTE — TELEPHONE ENCOUNTER
From: Noah Murillo  To: Henrietta Nageotte. Tawnya Foote  Sent: 12/11/2023 7:58 AM CST  Subject: Follow up urine test for Ponce Rodney Dr. Tawnya Foote  We got a urine sample over 48 hours after Price Cabrera finished her last dose of antibiotic. I plan to swing it by the your office today.     Pavan Irizarry

## 2023-12-13 DIAGNOSIS — R30.0 DYSURIA: ICD-10-CM

## 2023-12-13 DIAGNOSIS — N39.0 RECURRENT UTI: ICD-10-CM

## 2023-12-13 DIAGNOSIS — N39.0 URINARY TRACT INFECTION WITHOUT HEMATURIA, SITE UNSPECIFIED: Primary | ICD-10-CM

## 2023-12-13 RX ORDER — CEPHALEXIN 250 MG/5ML
500 POWDER, FOR SUSPENSION ORAL 3 TIMES DAILY
Qty: 210 ML | Refills: 0 | Status: SHIPPED
Start: 2023-12-13 | End: 2023-12-14

## 2023-12-14 ENCOUNTER — TELEPHONE (OUTPATIENT)
Dept: FAMILY MEDICINE CLINIC | Facility: CLINIC | Age: 33
End: 2023-12-14

## 2023-12-14 RX ORDER — CEPHALEXIN 250 MG/5ML
500 POWDER, FOR SUSPENSION ORAL 3 TIMES DAILY
Qty: 210 ML | Refills: 0 | Status: SHIPPED | OUTPATIENT
Start: 2023-12-14 | End: 2023-12-21

## 2023-12-14 NOTE — TELEPHONE ENCOUNTER
I don't think Esperanza got the script. He never got any notification from them and now the phones are down. I see on our side transmission to pharmacy failed.  Please call dad tonight

## 2023-12-14 NOTE — TELEPHONE ENCOUNTER
Spoke with pt's dad. Esperanza/ Amy Hall still having phone issues---dad not able to get through. (Script didn't go through electronically yesterday, so we printed it manually faxed it). Dad asks that we sent it to [facility] instead.     Script sent

## 2023-12-26 ENCOUNTER — LABORATORY ENCOUNTER (OUTPATIENT)
Dept: LAB | Age: 33
End: 2023-12-26
Attending: FAMILY MEDICINE
Payer: MEDICARE

## 2023-12-26 DIAGNOSIS — R30.0 DYSURIA: ICD-10-CM

## 2023-12-26 DIAGNOSIS — N39.0 RECURRENT UTI: ICD-10-CM

## 2023-12-26 DIAGNOSIS — N39.0 URINARY TRACT INFECTION WITHOUT HEMATURIA, SITE UNSPECIFIED: ICD-10-CM

## 2023-12-26 PROCEDURE — 87086 URINE CULTURE/COLONY COUNT: CPT

## 2024-01-01 NOTE — PATIENT INSTRUCTIONS
Ines Freitas's SCREENING SCHEDULE   Tests on this list are recommended by your physician but may not be covered, or covered at this frequency, by your insurer. Please check with your insurance carrier before scheduling to verify coverage. PREVENTATIVE SERVICES FREQUENCY &  COVERAGE DETAILS LAST COMPLETION DATE   Diabetes Screening    Fasting Blood Sugar /  Glucose    One screening every 12 months if never tested or if previously tested but not diagnosed with pre-diabetes   One screening every 6 months if diagnosed with pre-diabetes Lab Results   Component Value Date    GLU 87 12/07/2021        Cardiovascular Disease Screening    Lipid Panel  Cholesterol  Lipoprotein (HDL)  Triglycerides Covered every 5 years for all Medicare beneficiaries without apparent signs or symptoms of cardiovascular disease No results found for: CHOLEST, HDL, LDL, LDLD, LDLC, TRIG      Electrocardiogram (EKG)   Covered if needed at Welcome to Medicare, and non-screening if indicated for medical reasons -      Ultrasound Screening for Abdominal Aortic Aneurysm (AAA) Covered once in a lifetime for one of the following risk factors    Men who are 73-68 years old and have ever smoked    Anyone with a family history -     Colorectal Cancer Screening  Covered for ages 52-80; only need ONE of the following:    Colonoscopy   Covered every 10 years    Covered every 2 years if patient is at high risk or previous colonoscopy was abnormal -    No recommendations at this time    Flexible Sigmoidoscopy   Covered every 4 years -    Fecal Occult Blood Test Covered annually -   Bone Density Screening    Bone density screening    Covered every 2 years after age 72 if diagnosed with risk of osteoporosis or estrogen deficiency. Covered yearly for long-term glucocorticoid medication use (Steroids) No results found for this or any previous visit.       No recommendations at this time   Pap and Pelvic    Pap   Covered every 2 years for women at normal risk; Annually if at high risk 04/26/2018  Pap Smear due on 04/26/2021    Chlamydia Annually if high risk -  No recommendations at this time   Screening Mammogram    Mammogram     Recommend annually for all female patients aged 36 and older    One baseline mammogram covered for patients aged 32-38 -    No recommendations at this time    Immunizations    Influenza Covered once per flu season  Please get every year -  Influenza Vaccine(1) due on 10/01/2022    Pneumococcal Each vaccine (Uspobcg69 & Bnmlesnrv99) covered once after 72 Prevnar 13: -    Pvxcpuofl13: -     No recommendations at this time    Hepatitis B One screening covered for patients with certain risk factors   -  No recommendations at this time    Tetanus Toxoid Not covered by Medicare Part B unless medically necessary (cut with metal); may be covered with your pharmacy prescription benefits 10/06/2012    Tetanus, Diptheria and Pertusis TD and TDaP Not covered by Medicare Part B -  DTaP,Tdap,and Td Vaccines(1 - Tdap) due on 10/07/2012    Zoster Not covered by Medicare Part B; may be covered with your pharmacy  prescription benefits -  No recommendations at this time       Annual Monitoring of Persistent Medications (ACE/ARB, digoxin diuretics, anticonvulsants)    Potassium Annually Lab Results   Component Value Date    K 4.2 12/07/2021         Creatinine   Annually Lab Results   Component Value Date    CREATSERUM 0.38 (L) 12/07/2021         BUN Annually Lab Results   Component Value Date    BUN 11 12/07/2021       Drug Serum Conc Annually Lab Results   Component Value Date    VALP 103.0 (H) 12/07/2021            1. Encounter for annual health examination  I reviewed age-appropriate preventive screening exams as well as advanced directives. Continue supportive care, care challenges at home reviewed  Medication problem list updated  Follow-up with various specialist as scheduled    2.  Seizures ([facility] [address]  Continue current meds and neurology follow-up as scheduled    3. Neurogenic bladder  Continue current meds and antibiotic prophylaxis for recurrent UTIs    4. Cortical blindness, unspecified laterality-bilateral  Monitor clinically, continue supportive care    5. Spastic quadriplegic cerebral palsy (HCC)  Monitor clinically, observe for skin breakdown, discussed importance of frequent positional changes    6. Chronic constipation  Discussed importance of increased fluid intake or water, continue stool softeners    7. Kyphoscoliosis and scoliosis  Monitor clinically for any skin breakdown    8. Non-seasonal allergic rhinitis, unspecified trigger  Monitor for development of allergy symptoms    9. Gastroesophageal reflux disease without esophagitis  Monitor clinically, continue current meds    10. Need for vaccination  Reviewed reviewed age-appropriate mutations. Tdap booster and flu vaccine provided  - TETANUS, DIPHTHERIA TOXOIDS AND ACELLULAR PERTUSIS VACCINE (TDAP), >7 YEARS, IM USE    11. Tinea cruris  Continue clotrimazole and skin folds    Megan Charles.  Raleigh Snow, FAAFP 6

## 2024-01-02 ENCOUNTER — NURSE ONLY (OUTPATIENT)
Dept: OBGYN CLINIC | Facility: CLINIC | Age: 34
End: 2024-01-02
Payer: MEDICARE

## 2024-01-02 VITALS — HEIGHT: 63 IN | WEIGHT: 110 LBS | HEART RATE: 62 BPM | BODY MASS INDEX: 19.49 KG/M2

## 2024-01-02 PROCEDURE — 96372 THER/PROPH/DIAG INJ SC/IM: CPT | Performed by: NURSE PRACTITIONER

## 2024-01-02 RX ADMIN — MEDROXYPROGESTERONE ACETATE 150 MG: 150 INJECTION, SUSPENSION INTRAMUSCULAR at 15:26:00

## 2024-01-10 ENCOUNTER — OFFICE VISIT (OUTPATIENT)
Dept: FAMILY MEDICINE CLINIC | Facility: CLINIC | Age: 34
End: 2024-01-10
Payer: MEDICARE

## 2024-01-10 VITALS
HEART RATE: 98 BPM | SYSTOLIC BLOOD PRESSURE: 116 MMHG | DIASTOLIC BLOOD PRESSURE: 70 MMHG | OXYGEN SATURATION: 97 % | WEIGHT: 105 LBS | HEIGHT: 63 IN | TEMPERATURE: 98 F | RESPIRATION RATE: 20 BRPM | BODY MASS INDEX: 18.61 KG/M2

## 2024-01-10 DIAGNOSIS — J30.89 NON-SEASONAL ALLERGIC RHINITIS, UNSPECIFIED TRIGGER: ICD-10-CM

## 2024-01-10 DIAGNOSIS — Z86.2 HISTORY OF ANEMIA: ICD-10-CM

## 2024-01-10 DIAGNOSIS — N31.9 NEUROGENIC BLADDER: ICD-10-CM

## 2024-01-10 DIAGNOSIS — H47.619 CORTICAL BLINDNESS, UNSPECIFIED LATERALITY: ICD-10-CM

## 2024-01-10 DIAGNOSIS — K21.9 LARYNGOPHARYNGEAL REFLUX (LPR): ICD-10-CM

## 2024-01-10 DIAGNOSIS — G80.0 SPASTIC QUADRIPLEGIC CEREBRAL PALSY (HCC): ICD-10-CM

## 2024-01-10 DIAGNOSIS — M41.9 KYPHOSCOLIOSIS AND SCOLIOSIS: ICD-10-CM

## 2024-01-10 DIAGNOSIS — R56.9 SEIZURES (HCC): ICD-10-CM

## 2024-01-10 DIAGNOSIS — Z23 NEED FOR VACCINATION: ICD-10-CM

## 2024-01-10 DIAGNOSIS — Z51.81 MEDICATION MONITORING ENCOUNTER: ICD-10-CM

## 2024-01-10 DIAGNOSIS — K59.09 CHRONIC CONSTIPATION: ICD-10-CM

## 2024-01-10 DIAGNOSIS — K21.9 GASTROESOPHAGEAL REFLUX DISEASE WITHOUT ESOPHAGITIS: ICD-10-CM

## 2024-01-10 DIAGNOSIS — Z00.00 ENCOUNTER FOR ANNUAL HEALTH EXAMINATION: Primary | ICD-10-CM

## 2024-01-10 PROBLEM — R39.81 URINARY INCONTINENCE DUE TO IMMOBILITY: Status: ACTIVE | Noted: 2019-10-18

## 2024-01-10 PROCEDURE — 90686 IIV4 VACC NO PRSV 0.5 ML IM: CPT | Performed by: FAMILY MEDICINE

## 2024-01-10 PROCEDURE — G0439 PPPS, SUBSEQ VISIT: HCPCS | Performed by: FAMILY MEDICINE

## 2024-01-10 PROCEDURE — G0008 ADMIN INFLUENZA VIRUS VAC: HCPCS | Performed by: FAMILY MEDICINE

## 2024-01-10 NOTE — PATIENT INSTRUCTIONS
Sonal Freitas's SCREENING SCHEDULE   Tests on this list are recommended by your physician but may not be covered, or covered at this frequency, by your insurer.   Please check with your insurance carrier before scheduling to verify coverage.   PREVENTATIVE SERVICES FREQUENCY &  COVERAGE DETAILS LAST COMPLETION DATE   Diabetes Screening    Fasting Blood Sugar /  Glucose    One screening every 12 months if never tested or if previously tested but not diagnosed with pre-diabetes   One screening every 6 months if diagnosed with pre-diabetes Lab Results   Component Value Date    GLU 87 12/07/2021        Cardiovascular Disease Screening    Lipid Panel  Cholesterol  Lipoprotein (HDL)  Triglycerides Covered every 5 years for all Medicare beneficiaries without apparent signs or symptoms of cardiovascular disease No results found for: \"CHOLEST\", \"HDL\", \"LDL\", \"LDLD\", \"LDLC\", \"TRIG\"      Electrocardiogram (EKG)   Covered if needed at Welcome to Medicare, and non-screening if indicated for medical reasons -      Ultrasound Screening for Abdominal Aortic Aneurysm (AAA) Covered once in a lifetime for one of the following risk factors    Men who are 65-75 years old and have ever smoked    Anyone with a family history -     Colorectal Cancer Screening  Covered for ages 50-85; only need ONE of the following:    Colonoscopy   Covered every 10 years    Covered every 2 years if patient is at high risk or previous colonoscopy was abnormal -    No recommendations at this time    Flexible Sigmoidoscopy   Covered every 4 years -    Fecal Occult Blood Test Covered annually -   Bone Density Screening    Bone density screening    Covered every 2 years after age 65 if diagnosed with risk of osteoporosis or estrogen deficiency.    Covered yearly for long-term glucocorticoid medication use (Steroids) No results found for this or any previous visit.      No recommendations at this time   Pap and Pelvic    Pap   Covered every 2 years for women  at normal risk; Annually if at high risk -  Health Maintenance   Topic Date Due    Pap Smear  04/26/2021       Chlamydia Annually if high risk -  No recommendations at this time   Screening Mammogram    Mammogram     Recommend annually for all female patients aged 40 and older    One baseline mammogram covered for patients aged 35-39 -    No recommendations at this time    Immunizations    Influenza Covered once per flu season  Please get every year 01/30/2023  Influenza Vaccine(1) due on 10/01/2023    Pneumococcal Each vaccine (Rvhyofd87 & Xcimfzftv29) covered once after 65 Prevnar 13: -    [id number]: -     No recommendations at this time    Hepatitis B One screening covered for patients with certain risk factors   -  No recommendations at this time    Tetanus Toxoid Not covered by Medicare Part B unless medically necessary (cut with metal); may be covered with your pharmacy prescription benefits 10/06/2012    Tetanus, Diptheria and Pertusis TD and TDaP Not covered by Medicare Part B -  No recommendations at this time    Zoster Not covered by Medicare Part B; may be covered with your pharmacy  prescription benefits -  No recommendations at this time     Annual Monitoring of Persistent Medications (ACE/ARB, digoxin diuretics, anticonvulsants)    Potassium Annually Lab Results   Component Value Date    K 4.2 12/07/2021         Creatinine   Annually Lab Results   Component Value Date    CREATSERUM 0.38 (L) 12/07/2021         BUN Annually Lab Results   Component Value Date    BUN 11 12/07/2021       Drug Serum Conc Annually Lab Results   Component Value Date    VALP 103.0 (H) 12/07/2021          1. Encounter for annual health examination  I reviewed age-appropriate preventative health screening exams as well as advanced directives and immunizations.  Flu vaccine provided    2. Spastic quadriplegic cerebral palsy (HCC)  Continue intrathecal baclofen with oral for back up in the event of a baclofen pump failure.,   Follow-up with Ol[facility] Alamo for pump refills    3. Cortical blindness, unspecified laterality  Continue supportive care    4. Seizures (HCC)  Continue current meds and monitoring.  Follow-up with neurology as needed    5. Neurogenic bladder  I reviewed the results of the recent urology evaluation and the increased post void residual.  Responding to father's question about what can be done?  I discussed placement of a suprapubic catheter however both patient and father declined pursuing this patient    6. Non-seasonal allergic rhinitis, unspecified trigger  Continue current meds, monitor clinically    7. Chronic constipation  Discussed the importance of increased daily fluid intake with a goal of 2200 to 2500 mL of total fluid per day    8. Gastroesophageal reflux disease without esophagitis  Continue current meds, remain upright for 1 to 2 hours after intake    9. Laryngopharyngeal reflux (LPR)  Continue current meds and monitoring    10. Kyphoscoliosis and scoliosis  Monitor clinically    11. Medication monitoring encounter  May discontinue ferrous sulfate.  Follow-up in 6 months for labs.

## 2024-01-10 NOTE — H&P
HPI:   Sonal Freitas is a 33 year old female   Chief Complaint   Patient presents with    Physical     Medicare AWV    Gastro-esophageal Reflux    Seizures       Wt Readings from Last 6 Encounters:   01/10/24 105 lb (47.6 kg)   01/02/24 110 lb (49.9 kg)   10/10/23 106 lb (48.1 kg)   07/26/23 106 lb (48.1 kg)   07/25/23 106 lb (48.1 kg)   03/08/23 100 lb (45.4 kg)     Body mass index is 18.6 kg/m².     AST (U/L)   Date Value   12/07/2021 8 (L)   07/27/2019 14   07/27/2019 12   05/23/2014 16   02/11/2013 39     ALT (U/L)   Date Value   12/07/2021 19   07/27/2019 13   07/27/2019 13   05/23/2014 20   02/11/2013 24        Current Outpatient Medications   Medication Sig Dispense Refill    clotrimazole-betamethasone 1-0.05 % External Cream APPLY THIN LAYER TOPICALLY TO THE AFFECTED AREA THREE TIMES DAILY UNTIL CLEAR 15 g 1    sucralfate 1 g Oral Tab Take 1 tablet (1 g total) by mouth 4 (four) times daily before meals and nightly. May crush the tablet to form a suspension 40 tablet 1    montelukast 10 MG Oral Tab Take 1 tablet (10 mg total) by mouth nightly. 90 tablet 3    NAYZILAM 5 MG/0.1ML Nasal Solution 5 mg by Nasal route as needed (For break through seizures).      medroxyPROGESTERone Acetate 150 MG/ML Intramuscular Suspension Prefilled Syringe Inject 150 mg as directed every 3 (three) months.      Valproate Sodium (VALPROIC ACID) 250 MG/5ML Oral Solution Take 7 mL by mouth in the morning and 7 mL before bedtime.      Wound Dressings ([facility] WOUND/BURN DRESSING) External Paste Apply 1 Application topically 2 (two) times daily as needed. 1 Tube 1    Methenamine Hippurate 1 g Oral Tab Take 1 tablet (1 g total) by mouth Q12H.      magnesium 250 MG Oral Tab Take 1 tablet (250 mg total) by mouth 2 (two) times daily. Take 1/2 tablet twice daily crushed then dissolve in water as needed for muscle cramps      bisacodyl 10 MG Rectal Suppos Place 1 suppository (10 mg total) rectally daily. 30 suppository 0    albuterol  sulfate (2.5 MG/3ML) 0.083% Inhalation Nebu Soln INHALE 1 VIAL IN NEBULIZER EVERY 4 HOURS AS NEEDED FOR WHEEZING OR SHORTNESS OF BREATH 75 mL 2    Nutritional Supplements (BOOST OR) Take by mouth.      Ferrous Gluconate 239 (27 Fe) MG Oral Tab Take 1 tablet by mouth daily. 1 tablet 0    Cetirizine HCl 5 MG/5ML Oral Syrup Take 10 mL by mouth daily.      Pseudoephedrine HCl 30 MG Oral Tab Take 1 tablet (30 mg total) by mouth 2 (two) times daily as needed for congestion.      Fluticasone Propionate 50 MCG/ACT Nasal Suspension 2 sprays by Each Nare route daily.      Mometasone Furoate 0.1 % External Cream Apply 1 Application topically daily as needed. 15 g 0    Polyethylene Glycol 3350 Oral Powder   0    Misc Natural Products (CRANBERRY/PROBIOTIC OR) Take 1 tablet by mouth daily.      Potassium Gluconate 595 MG Oral Cap Take 1 tablet by mouth 2 (two) times daily.      baclofen (LIORESAL) 10 MG Oral Tab Take 1 tablet (10 mg total) by mouth 4 (four) times daily. To be used in case of baclofen pump failure  6    acetaminophen (TYLENOL INFANTS) 160 MG/5ML Oral Suspension Take 19.5 mL by mouth every 4 (four) hours as needed for Fever or Pain. 240 mL 0    Multiple Vitamins-Minerals (CENTRUM) Oral Liquid Take 10 mL by mouth daily.      Glycerin, Laxative, (GLYCERIN, CHILD, RE) Place  rectally.      Baclofen 0.05 MG/ML Intrathecal Solution by Intrathecal route.      glycopyrrolate 1 MG Oral Tab Take 1 tablet (1 mg total) by mouth 2 (two) times daily.      DiphenhydrAMINE HCl 12.5 MG/5ML Oral Liquid Take 10 mL (25 mg total) by mouth nightly as needed for Allergies. (Patient not taking: Reported on 1/10/2024)       Allergies   Allergen Reactions    Levonorgestrel OTHER (SEE COMMENTS)     uknown    Vancomycin OTHER (SEE COMMENTS)     Scout syndrome    Seasonal         Past Medical History:   Diagnosis Date    Chronic constipation     Chronic rhinitis     Cortical blindness     CP (cerebral palsy), spastic, quadriplegic (HCC)      Esophageal reflux     [facility] DR [facility]    History of chicken pox     Seizures (HCC)     SEES DR BARRAGAN@ [facility]      Past Surgical History:   Procedure Laterality Date    BOWEL RESECTION  06/03/2019    small bowel resection due to SBO @ [facility]    BOWEL RESECTION  08/06/2020    Resection of strictured ileocolic anastomosis @ [facility]    OTHER SURGICAL HISTORY      G-TUBE    OTHER SURGICAL HISTORY      HIMA FUNDOPLICATION    OTHER SURGICAL HISTORY      BILTERAL EYE SURGIERIES    OTHER SURGICAL HISTORY      INTRATHECAL PUMP    OTHER SURGICAL HISTORY      HAMSTRING LENGTHENING    OTHER SURGICAL HISTORY      BILATERAL HIP SURGERY    OTHER SURGICAL HISTORY      POSTERIOR SPINAL FUSION FOR SCOLIOSIS    OTHER SURGICAL HISTORY  2/2011  [facility]    Laparoscopic placement of WILFRED gastrostomy button    OTHER SURGICAL HISTORY  7/2011  [facility]    G-button change    OTHER SURGICAL HISTORY  2013    INTRATHECAL BACLOFEN PUMP EXCHANGE    OTHER SURGICAL HISTORY  02/19/2020    exp lap w/ lysis of adhesions      Family History   Problem Relation Age of Onset    Hypertension Father     Obesity Father     Lipids Father     Diabetes Father         pre-dm    Hypertension Mother     Other (Other) Mother         HEMORRHAGIC STROKE    No Known Problems Sister         Social History     Socioeconomic History    Marital status: Single   Tobacco Use    Smoking status: Never    Smokeless tobacco: Never   Vaping Use    Vaping Use: Never used   Substance and Sexual Activity    Alcohol use: No     Alcohol/week: 0.0 standard drinks of alcohol    Drug use: No    Sexual activity: Never     Birth control/protection: Injection     Comment: Depo   Other Topics Concern    Caffeine Concern No    Exercise No    Seat Belt Yes    Special Diet No    Stress Concern No    Weight Concern No     [employer] in [employer]  Patient eats a general diet and will receive Ensure or boost via G-tube when appetite is poor, weight loss or illness  decreases adequate caloric intake.  Patient will also receive free water via G-tube between's 1000 mL and 1500 mL daily to supplement oral intake  Specialists: Dr Rodriguez/ [facility]- pain pump , Dr Young- gastroenterologist, Dr Mayfield- neurology in [place], Dr Mondragon- Urologist, [facility]- gyne, Dr Molina-general surgery     REVIEW OF SYSTEMS:   GENERAL: generally feels well, no unusual fatigue,no excessive daytime drowsiness, good appetite, wt stable over the past year, fluids 3108-2570 ml thru G-tube + 300-600 ml orally  SKIN: denies any skin breakdown or rashes, chronic intermittent skin breakdown in both pinna, patient here tends to rub on her head support, intermittent redness in groin creases   EYES:+cortical blindness  HEENT: denies ST, denies snoring and reported periods of apnea, denies hearing deficits, +drooling  LUNGS: denies shortness of breath with exertion, no coughing or wheezing  CARDIOVASCULAR: denies chest pain on exertion, palpations, anginal equivalent symptoms, no claudication   GI: denies abdominal pain,denies heartburn, diarrhea, or change in bowel habits, all meds thru g-tube, all nutrition po except when decreased intake, constipation -controlled on 1/2 dose miralax and mag ox, usually BM daily  : denies dysuria, vaginal discharge or itching, no menses on depo-provera, + incontinence , recurrent UTIs-managed on bid methanamine, getting Depo-provera thru gyne, last exam 2018, told no annual visits needed, patient had urology eval on 1/5/2024.  Dr. Serra, postvoid residual greater than 181 mL  MUSCULOSKELETAL: denies back or joint pain, no braces or adaptive equipment, patient in specialized wheelchair  NEURO: denies headaches, tremors, dizziness, numbness, tingling, truncal weakness, no recent seizures, spasms of lower extremities, managed with additional baclofen via G-tube  PSYCHE: denies depression or anxiety, denies any sleep difficulty  HEMATOLOGIC: denies unexplained  bruising or bleeding   ENDOCRINE: denies heat or cold intolerance , no unexplained wt loss or gain  FUNCTIONAL ABILITY: Do you need help with preparing meals? yes, Dressing? yes, Hygiene? yes, Using the bathroom? yes, Transportation? yes, Shopping? yes, Taking medications? yes, Banking? yes  SAFETY: Does your home have throw rugs? no, Adequate lighting? yes, Grab bars in bathroom/shower/tub? yes,  Handrails on stairs? yes, Some alarms? yes   Get and Go test > 12 seconds?  yes  EXAM:   /70 (BP Location: Left arm, Patient Position: Sitting, Cuff Size: adult)   Pulse 98   Temp 98.2 °F (36.8 °C) (Temporal)   Resp 20   Ht 5' 3\" (1.6 m)   Wt 105 lb (47.6 kg)   SpO2 97%   BMI 18.60 kg/m²   Body mass index is 18.6 kg/m².   GENERAL: well developed, well nourished,in no apparent distress  SKIN: no suspicious lesions, SQ pump RLQ, G-tube site LLQ clean and dry, 7 mm epidermal nevus right occipital area, no irritation , superficial left pinna ulcer  HEENT: Ears: TMs intact, canals clear, hearing normal, Nose: turbinates pale and congested with clear mucoid dc,Septum midline, Pharynx: no pnd, erythema, or airway obstruction, good dental repair, hypersalivation  EYES:PERRLA, dysconjugate gaze,  Fundi: benign,conjunctiva are clear, lids and lashes normal  NECK: supple,no adenopathy,no bruits, no thyromegaly or palpable nodules  BREAST: deferred  LUNGS: clear to auscultation, no w/r/r, poor inspiratory effort  CARDIO: RRR without murmur, strong peripheral pulses, no peripheral edema  GI: good BS's,no masses, HSM or tenderness, soft, well healed post surgical scars  :  deferred  RECTAL:deferred  MUSCULOSKELETAL: + scoliosis, + flexion contractures of both knees, elbows and wrists, and hands, hypotonia of the trunk  EXTREMITIES:  no cyanosis, clubbing or edema, no varicosities  NEURO: Patient able to answer yes or no questions, occasional one-word answers, sensory grossly intact, DTRs +4/4 bilaterally, hyperreflexia    PSYCH:  Speech soft, appearance: neat, Affect:smiling  ASSESSMENT AND PLAN:   Sonal Freitas is a 33 year old female   Encounter Diagnoses   Name Primary?    Encounter for annual health examination Yes    Spastic quadriplegic cerebral palsy (HCC)     Cortical blindness, unspecified laterality     Seizures (HCC)     Neurogenic bladder     Non-seasonal allergic rhinitis, unspecified trigger     Chronic constipation     Gastroesophageal reflux disease without esophagitis     Laryngopharyngeal reflux (LPR)     Kyphoscoliosis and scoliosis     Medication monitoring encounter     History of anemia      Orders Placed This Encounter   Procedures    Comp Metabolic Panel (14) [E]    CBC W Differential W Platelet [E]    Valproic Acid, (Depakene)    Ferritin [E]     Meds & Refills for this Visit:  Requested Prescriptions      No prescriptions requested or ordered in this encounter     Imaging & Consults:  None  No follow-ups on file.  Patient Instructions     Sonal Freitas's SCREENING SCHEDULE   Tests on this list are recommended by your physician but may not be covered, or covered at this frequency, by your insurer.   Please check with your insurance carrier before scheduling to verify coverage.   PREVENTATIVE SERVICES FREQUENCY &  COVERAGE DETAILS LAST COMPLETION DATE   Diabetes Screening    Fasting Blood Sugar /  Glucose    One screening every 12 months if never tested or if previously tested but not diagnosed with pre-diabetes   One screening every 6 months if diagnosed with pre-diabetes Lab Results   Component Value Date    GLU 87 12/07/2021        Cardiovascular Disease Screening    Lipid Panel  Cholesterol  Lipoprotein (HDL)  Triglycerides Covered every 5 years for all Medicare beneficiaries without apparent signs or symptoms of cardiovascular disease No results found for: \"CHOLEST\", \"HDL\", \"LDL\", \"LDLD\", \"LDLC\", \"TRIG\"      Electrocardiogram (EKG)   Covered if needed at Welcome to Medicare, and non-screening if  indicated for medical reasons -      Ultrasound Screening for Abdominal Aortic Aneurysm (AAA) Covered once in a lifetime for one of the following risk factors    Men who are 65-75 years old and have ever smoked    Anyone with a family history -     Colorectal Cancer Screening  Covered for ages 50-85; only need ONE of the following:    Colonoscopy   Covered every 10 years    Covered every 2 years if patient is at high risk or previous colonoscopy was abnormal -    No recommendations at this time    Flexible Sigmoidoscopy   Covered every 4 years -    Fecal Occult Blood Test Covered annually -   Bone Density Screening    Bone density screening    Covered every 2 years after age 65 if diagnosed with risk of osteoporosis or estrogen deficiency.    Covered yearly for long-term glucocorticoid medication use (Steroids) No results found for this or any previous visit.      No recommendations at this time   Pap and Pelvic    Pap   Covered every 2 years for women at normal risk; Annually if at high risk -  Health Maintenance   Topic Date Due    Pap Smear  04/26/2021       Chlamydia Annually if high risk -  No recommendations at this time   Screening Mammogram    Mammogram     Recommend annually for all female patients aged 40 and older    One baseline mammogram covered for patients aged 35-39 -    No recommendations at this time    Immunizations    Influenza Covered once per flu season  Please get every year 01/30/2023  Influenza Vaccine(1) due on 10/01/2023    Pneumococcal Each vaccine ([id number] & [id number]) covered once after 65 Prevnar 13: -    [id number]: -     No recommendations at this time    Hepatitis B One screening covered for patients with certain risk factors   -  No recommendations at this time    Tetanus Toxoid Not covered by Medicare Part B unless medically necessary (cut with metal); may be covered with your pharmacy prescription benefits 10/06/2012    Tetanus, Diptheria and Pertusis TD and TDaP Not covered  by Medicare Part B -  No recommendations at this time    Zoster Not covered by Medicare Part B; may be covered with your pharmacy  prescription benefits -  No recommendations at this time     Annual Monitoring of Persistent Medications (ACE/ARB, digoxin diuretics, anticonvulsants)    Potassium Annually Lab Results   Component Value Date    K 4.2 12/07/2021         Creatinine   Annually Lab Results   Component Value Date    CREATSERUM 0.38 (L) 12/07/2021         BUN Annually Lab Results   Component Value Date    BUN 11 12/07/2021       Drug Serum Conc Annually Lab Results   Component Value Date    VALP 103.0 (H) 12/07/2021          1. Encounter for annual health examination  I reviewed age-appropriate preventative health screening exams as well as advanced directives and immunizations.  Flu vaccine provided    2. Spastic quadriplegic cerebral palsy (HCC)  Continue intrathecal baclofen with oral for back up in the event of a baclofen pump failure.,  Follow-up with [facility] for pump refills    3. Cortical blindness, unspecified laterality  Continue supportive care    4. Seizures (HCC)  Continue current meds and monitoring.  Follow-up with neurology as needed    5. Neurogenic bladder  I reviewed the results of the recent urology evaluation and the increased post void residual.  Responding to father's question about what can be done?  I discussed placement of a suprapubic catheter however both patient and father declined pursuing this patient    6. Non-seasonal allergic rhinitis, unspecified trigger  Continue current meds, monitor clinically    7. Chronic constipation  Discussed the importance of increased daily fluid intake with a goal of 2200 to 2500 mL of total fluid per day    8. Gastroesophageal reflux disease without esophagitis  Continue current meds, remain upright for 1 to 2 hours after intake    9. Laryngopharyngeal reflux (LPR)  Continue current meds and monitoring    10. Kyphoscoliosis and  scoliosis  Monitor clinically    11. Medication monitoring encounter  May discontinue ferrous sulfate.  Follow-up in 6 months for labs.    Kal Wilkinson DO, FAAFP

## 2024-01-18 RX ORDER — MONTELUKAST SODIUM 10 MG/1
10 TABLET ORAL NIGHTLY
Qty: 90 TABLET | Refills: 3 | Status: SHIPPED | OUTPATIENT
Start: 2024-01-18

## 2024-03-26 ENCOUNTER — NURSE ONLY (OUTPATIENT)
Dept: OBGYN CLINIC | Facility: CLINIC | Age: 34
End: 2024-03-26
Payer: MEDICARE

## 2024-03-26 VITALS
HEART RATE: 83 BPM | BODY MASS INDEX: 18.78 KG/M2 | HEIGHT: 63 IN | DIASTOLIC BLOOD PRESSURE: 72 MMHG | WEIGHT: 106 LBS | SYSTOLIC BLOOD PRESSURE: 114 MMHG

## 2024-03-26 DIAGNOSIS — Z30.42 ENCOUNTER FOR SURVEILLANCE OF INJECTABLE CONTRACEPTIVE: Primary | ICD-10-CM

## 2024-03-26 PROCEDURE — 96372 THER/PROPH/DIAG INJ SC/IM: CPT | Performed by: NURSE PRACTITIONER

## 2024-03-26 RX ORDER — MEDROXYPROGESTERONE ACETATE 150 MG/ML
150 INJECTION, SUSPENSION INTRAMUSCULAR ONCE
Status: COMPLETED | OUTPATIENT
Start: 2024-03-26 | End: 2024-03-26

## 2024-03-26 RX ADMIN — MEDROXYPROGESTERONE ACETATE 150 MG: 150 INJECTION, SUSPENSION INTRAMUSCULAR at 14:34:00

## 2024-03-26 NOTE — PROGRESS NOTES
Patient given Depo in right deltoid, patient here with caregiver, caregiver aware to make next Depo with Annual as well between June 11 and June 25

## 2024-04-12 RX ORDER — CLOTRIMAZOLE AND BETAMETHASONE DIPROPIONATE 10; .64 MG/G; MG/G
CREAM TOPICAL
Qty: 15 G | Refills: 1 | Status: SHIPPED | OUTPATIENT
Start: 2024-04-12

## 2024-04-12 NOTE — TELEPHONE ENCOUNTER
CLOTRIMAZOLE-BETAMETHASONE 1-0.05 % External Cream   Last office visit:  1/10/24    Future Appointments   Date Time Provider Department Center   6/11/2024  2:30 PM Marly Gomez APN [facility] [place]   Last filled:  8/1/23  15 g with 1 refill   Last labs:

## 2024-04-22 ENCOUNTER — MED REC SCAN ONLY (OUTPATIENT)
Dept: FAMILY MEDICINE CLINIC | Facility: CLINIC | Age: 34
End: 2024-04-22

## 2024-06-11 ENCOUNTER — OFFICE VISIT (OUTPATIENT)
Dept: OBGYN CLINIC | Facility: CLINIC | Age: 34
End: 2024-06-11
Payer: MEDICARE

## 2024-06-11 VITALS
BODY MASS INDEX: 18.78 KG/M2 | SYSTOLIC BLOOD PRESSURE: 108 MMHG | HEIGHT: 63 IN | WEIGHT: 106 LBS | DIASTOLIC BLOOD PRESSURE: 68 MMHG | HEART RATE: 102 BPM

## 2024-06-11 DIAGNOSIS — Z01.419 WELL WOMAN EXAM WITH ROUTINE GYNECOLOGICAL EXAM: Primary | ICD-10-CM

## 2024-06-11 DIAGNOSIS — Z30.42 ENCOUNTER FOR SURVEILLANCE OF INJECTABLE CONTRACEPTIVE: ICD-10-CM

## 2024-06-11 PROCEDURE — G0101 CA SCREEN;PELVIC/BREAST EXAM: HCPCS | Performed by: NURSE PRACTITIONER

## 2024-06-11 PROCEDURE — 96372 THER/PROPH/DIAG INJ SC/IM: CPT | Performed by: NURSE PRACTITIONER

## 2024-06-11 RX ORDER — MEDROXYPROGESTERONE ACETATE 150 MG/ML
150 INJECTION, SUSPENSION INTRAMUSCULAR
Status: SHIPPED | OUTPATIENT
Start: 2024-06-11 | End: 2025-06-06

## 2024-06-11 RX ADMIN — MEDROXYPROGESTERONE ACETATE 150 MG: 150 INJECTION, SUSPENSION INTRAMUSCULAR at 15:25:00

## 2024-06-11 NOTE — PROGRESS NOTES
Here for Routine Annual Exam  She was accompanied by caregiver  She is able to answer yes/no questions  Menses are absent with her Depo Provera injection.  She is not/never sexually active  Discussed breast health , osteoporosis risk and concern with falls.    ROS: No Cardiac, Respiratory, GI,  or Neurological symptoms.    PE:  GENERAL: well developed, well nourished, in no apparent distress  SKIN: no rashes, no suspicious lesions  HEENT: normal  NECK: supple; no thyroidmegaly, no adenopathy  LUNGS: clear to auscultation  CARDIOVASCULAR: normal S1, S2, RRR  BREASTS: firm, nontendder, no palpable masses or nodes, no nipple discharge, no skin changes, no axillary adenopathy,    ABDOMEN: Soft, non distended; non tender, no masses  GYNE/: External Genitalia: Normal                       Vagina: normal    Patient unable to tolerate extended speculum exam so it was discontinued prior to pap being completed. Bimanual exam not done    EXTREMITIES:  non tender without edema    A/P:   1. Well woman exam with routine gynecological exam  Regular self breast exams recommended  Discussed adequate calcium intake    2. Encounter for surveillance of injectable contraceptive  - medroxyPROGESTERone (Depo-Provera) 150 MG/ML injection 150 mg    Return in 3 months for next injection     Return to clinic 1 year for routine exam, or as needed with any concerns or question

## 2024-09-05 ENCOUNTER — NURSE ONLY (OUTPATIENT)
Dept: OBGYN CLINIC | Facility: CLINIC | Age: 34
End: 2024-09-05
Payer: MEDICARE

## 2024-09-05 VITALS
DIASTOLIC BLOOD PRESSURE: 70 MMHG | HEIGHT: 63 IN | SYSTOLIC BLOOD PRESSURE: 102 MMHG | HEART RATE: 104 BPM | BODY MASS INDEX: 17.36 KG/M2 | WEIGHT: 98 LBS

## 2024-09-05 PROCEDURE — 96372 THER/PROPH/DIAG INJ SC/IM: CPT | Performed by: NURSE PRACTITIONER

## 2024-09-05 RX ADMIN — MEDROXYPROGESTERONE ACETATE 150 MG: 150 INJECTION, SUSPENSION INTRAMUSCULAR at 10:05:00

## 2024-09-05 NOTE — PROGRESS NOTES
Patient seen today for her Depo injection.   Last dose was 6/11 and was due 8/17 - 9/10.   Patient consents to treatment and tolerated injection well.  Patient given card with today's date and with next dates due written on it.   Explained to patient that she will need to return between Nov 21 and dec 5 for her next injection.  Encouraged her to schedule return appointment before leaving today. She states understanding.    Pt is a 70 year old, ,  female, living with her  and developmentally disabled adult son, with psychiatric hx of depression, anxiety, psychosis and prior hospitalizations (most recently at [facility] in 11/2015), and extensive medical hx, including asthma, COPD (quit smoking in 1997), HTN, HLD, Grave's disease, hypothyroidism, breast CA, kidney CA, cholecystectemy, laminectomy, admitted for CP and SPB, consulted for anxiety.    Patient was seen and assessed at bedside. Patient is alert and oriented, no confusion noted.  Patient focused on her physical symptoms being the cause of her anxiety (CP and SOB).  She is guarded about her psychiatry hx but does speak of being on risperidone, latuda, ativan in the past.  As per chart review patient also with hx of being on Neurontin and Cymbalta.  patient states she has no hx of psychosis but does have a hx of MDD and anxiety.  She reports having 10 ext treatment sin the 1980s.   Denies feeling depressed at this time, some situational mood changes, but does not describe depressed mood, anhedonia, changes in energy/concentration/appetite, sleep disturbances, or feelings of guilt. Denies a hx of SA and no SI/HI at this time.  She reports her lexapro was recently increased to 20mg but contributes this increase to her hypertension. No hx of dov reported.  NO substance use noted.  Denies psychosis but does make suspicious remark during interview " my  sends me to places and tells people im crazy when he needs me out of the house".  Patient feels her anxiety was previously better controlled when she was on a self reported 1mg ativan qid.    ISTOP ref # [id number]  Most recent xanax script from 4-9-19 17 day supply, 34 pills xanax 0.5    Spouse reported wife does not want him speaking to professionals about her psych hx, however did report to writer patient "believes someone is putting things on her causing her to have an asthma attack."  He states she is mildly paranoid, but it does not interfere with her safety or the safety of others.     Awaiting c/b from [facility]. Pt is a 70 year old, ,  female, living with her  and developmentally disabled adult son, with psychiatric hx of depression, anxiety, psychosis and prior hospitalizations (most recently at [facility] in 11/2015), and extensive medical hx, including asthma, COPD (quit smoking in 1997), HTN, HLD, Grave's disease, hypothyroidism, breast CA, kidney CA, cholecystectemy, laminectomy, admitted for CP and SPB, consulted for anxiety.    Patient was seen and assessed at bedside. Patient is alert and oriented, no confusion noted.  Patient focused on her physical symptoms being the cause of her anxiety (CP and SOB).  She is guarded about her psychiatry hx but does speak of being on risperidone, latuda, ativan in the past.  As per chart review patient also with hx of being on Neurontin and Cymbalta.  patient states she has no hx of psychosis but does have a hx of MDD and anxiety.  She reports having 10 ect treatment since the 1980s.   Denies feeling depressed at this time, some situational mood changes, but does not describe depressed mood, anhedonia, changes in energy/concentration/appetite, sleep disturbances, or feelings of guilt. Denies a hx of SA and no SI/HI at this time.  She reports her lexapro was recently increased to 20mg but contributes this increase to her hypertension. No hx of dov reported.  NO substance use noted.  Denies psychosis but does make suspicious remark during interview " my  sends me to places and tells people im crazy when he needs me out of the house".  Patient feels her anxiety was previously better controlled when she was on a self reported 1mg ativan qid.    ISTOP ref # [id number]  Most recent xanax script from 4-9-19 17 day supply, 34 pills xanax 0.5    Spouse reported wife does not want him speaking to professionals about her psych hx, however did report to writer patient "believes someone is putting things on her causing her to have an asthma attack."  He states she is mildly paranoid, but it does not interfere with her safety or the safety of others.     Awaiting c/b from [facility].

## 2024-09-18 ENCOUNTER — TELEPHONE (OUTPATIENT)
Dept: FAMILY MEDICINE CLINIC | Facility: CLINIC | Age: 34
End: 2024-09-18

## 2024-09-18 RX ORDER — ALBUTEROL SULFATE 0.83 MG/ML
SOLUTION RESPIRATORY (INHALATION)
Qty: 75 ML | Refills: 0 | Status: SHIPPED | OUTPATIENT
Start: 2024-09-18

## 2024-09-18 NOTE — TELEPHONE ENCOUNTER
I believe they also have a nebulizer that they can use to help mobilize secretions if any wheezing or difficulty breathing

## 2024-09-18 NOTE — TELEPHONE ENCOUNTER
Spoke with father.  Cold like symptoms on Sunday morning.  Symptoms are persisting.  Home covid tests negative done on Saturday and Sunday.  Patient has a fever of 99.9 max temp. Chills, body aches, sinus pain/pressure, and cough.  Dad denies any wheezing or difficulty breathing.  OTC medication not helping.  Dad asking for an appointment.  Appointment scheduled for tomorrow.  Advised may use humidifier, saline, and steam therapy.  If any chest pain, difficulty breathing, patient should be seen in the ER.  Dad verbalized understanding.    Future Appointments   Date Time Provider Department Center   9/19/2024 10:15 AM Kal Wilkinson, DO [facility] [place]   11/25/2024 10:00 AM [facility] [place]

## 2024-09-19 ENCOUNTER — TELEPHONE (OUTPATIENT)
Dept: FAMILY MEDICINE CLINIC | Facility: CLINIC | Age: 34
End: 2024-09-19

## 2024-09-19 ENCOUNTER — OFFICE VISIT (OUTPATIENT)
Dept: FAMILY MEDICINE CLINIC | Facility: CLINIC | Age: 34
End: 2024-09-19
Payer: MEDICARE

## 2024-09-19 VITALS
TEMPERATURE: 98 F | SYSTOLIC BLOOD PRESSURE: 110 MMHG | DIASTOLIC BLOOD PRESSURE: 66 MMHG | OXYGEN SATURATION: 94 % | HEART RATE: 88 BPM

## 2024-09-19 DIAGNOSIS — U07.1 COVID-19 VIRUS INFECTION: Primary | ICD-10-CM

## 2024-09-19 DIAGNOSIS — M41.9 RESTRICTIVE LUNG DISEASE DUE TO KYPHOSCOLIOSIS: ICD-10-CM

## 2024-09-19 DIAGNOSIS — B34.9 VIRAL ILLNESS: ICD-10-CM

## 2024-09-19 DIAGNOSIS — R56.9 SEIZURES (HCC): ICD-10-CM

## 2024-09-19 DIAGNOSIS — J98.4 RESTRICTIVE LUNG DISEASE DUE TO KYPHOSCOLIOSIS: ICD-10-CM

## 2024-09-19 DIAGNOSIS — G80.0 SPASTIC QUADRIPLEGIC CEREBRAL PALSY (HCC): ICD-10-CM

## 2024-09-19 LAB
CONTROL LINE PRESENT WITH A CLEAR BACKGROUND (YES/NO): YES YES/NO
COVID19 BINAX NOW ANTIGEN: DETECTED
KIT LOT #: NORMAL NUMERIC
OPERATOR ID: ABNORMAL
POCT LOT NUMBER: ABNORMAL
STREP GRP A CUL-SCR: NEGATIVE

## 2024-09-19 PROCEDURE — G2211 COMPLEX E/M VISIT ADD ON: HCPCS | Performed by: FAMILY MEDICINE

## 2024-09-19 PROCEDURE — 99213 OFFICE O/P EST LOW 20 MIN: CPT | Performed by: FAMILY MEDICINE

## 2024-09-19 PROCEDURE — 87880 STREP A ASSAY W/OPTIC: CPT | Performed by: FAMILY MEDICINE

## 2024-09-19 NOTE — TELEPHONE ENCOUNTER
Calling to clarify nirmatrelvir-ritonavir 150-100 MG Oral Tablet Therapy Pack, pharmacist said how the prescription was written was very conflicting.

## 2024-09-19 NOTE — PATIENT INSTRUCTIONS
I discussed diagnosis and management strategies.  Would recommend use of albuterol nebulizers with any signs of respiratory difficulty or inability to clear secretions  Tylenol as needed  Based on patient's multiple comorbidities, will start Paxlovid, may be given through G-tube  Increase fluid intake, may use Benadryl and Claritin as needed for nasal congestion  Call or follow-up if no significant improvement or development of any decline in condition over the next 2 to 3 days.

## 2024-09-19 NOTE — PROGRESS NOTES
HPI:   Sonal Freitas is a 33 year old female who presents for upper respiratory symptoms for  1  weeks. Patient reports sore throat, congestion, low grade fever, dry cough, HA .  Chief Complaint   Patient presents with    Sinus Problem     Sinus pain, headache    Cough     Cough, congestion     Here w/ father and caregiver  Father states that about a week ago patient developed mild respiratory symptoms with nasal congestion and postnasal drip.  This started getting better until until 9/16 when she seem to make a downturn.  Of note, one of her caregivers was diagnosed with COVID, her last exposure was on 9/13, then patient declined 2 to 3 days later.  Current Outpatient Medications   Medication Sig Dispense Refill    albuterol (2.5 MG/3ML) 0.083% Inhalation Nebu Soln INHALE 1 VIAL IN NEBULIZER EVERY 4 HOURS AS NEEDED FOR WHEEZING OR SHORTNESS OF BREATH 75 mL 0    clotrimazole-betamethasone 1-0.05 % External Cream APPLY THIN LAYER TOPICALLY TO THE AFFECTED AREA THREE TIMES DAILY UNTIL CLEAR 15 g 1    mupirocin 2 % External Ointment Apply 1 Application topically 3 (three) times daily as needed. APPLY TO AFFECTED AREA      montelukast 10 MG Oral Tab Take 1 tablet (10 mg total) by mouth nightly. 90 tablet 3    sucralfate 1 g Oral Tab Take 1 tablet (1 g total) by mouth 4 (four) times daily before meals and nightly. May crush the tablet to form a suspension 40 tablet 1    NAYZILAM 5 MG/0.1ML Nasal Solution 5 mg by Nasal route as needed (For break through seizures).      Valproate Sodium (VALPROIC ACID) 250 MG/5ML Oral Solution Take 7 mL by mouth in the morning and 7 mL before bedtime.      Wound Dressings (MEDIHONEY WOUND/BURN DRESSING) External Paste Apply 1 Application topically 2 (two) times daily as needed. 1 Tube 1    Methenamine Hippurate 1 g Oral Tab Take 1 tablet (1 g total) by mouth Q12H.      bisacodyl 10 MG Rectal Suppos Place 1 suppository (10 mg total) rectally daily. 30 suppository 0    Nutritional  Supplements (BOOST OR) Take by mouth.      Cetirizine HCl 5 MG/5ML Oral Syrup Take 10 mL by mouth daily.      Pseudoephedrine HCl 30 MG Oral Tab Take 1 tablet (30 mg total) by mouth 2 (two) times daily as needed for congestion.      DiphenhydrAMINE HCl 12.5 MG/5ML Oral Liquid Take 10 mL (25 mg total) by mouth nightly as needed for Allergies.      Fluticasone Propionate 50 MCG/ACT Nasal Suspension 2 sprays by Each Nare route daily.      Mometasone Furoate 0.1 % External Cream Apply 1 Application topically daily as needed. 15 g 0    Polyethylene Glycol 3350 Oral Powder   0    Misc Natural Products (CRANBERRY/PROBIOTIC OR) Take 1 tablet by mouth daily.      Potassium Gluconate 595 MG Oral Cap Take 1 tablet by mouth 2 (two) times daily.      baclofen (LIORESAL) 10 MG Oral Tab Take 1 tablet (10 mg total) by mouth 4 (four) times daily. To be used in case of baclofen pump failure  6    acetaminophen (TYLENOL INFANTS) 160 MG/5ML Oral Suspension Take 19.5 mL by mouth every 4 (four) hours as needed for Fever or Pain. 240 mL 0    Multiple Vitamins-Minerals (CENTRUM) Oral Liquid Take 10 mL by mouth daily.      Glycerin, Laxative, (GLYCERIN, CHILD, RE) Place  rectally.      Baclofen 0.05 MG/ML Intrathecal Solution by Intrathecal route.      glycopyrrolate 1 MG Oral Tab Take 1 tablet (1 mg total) by mouth 2 (two) times daily.      magnesium 250 MG Oral Tab Take 1 tablet (250 mg total) by mouth 2 (two) times daily. Take 1/2 tablet twice daily crushed then dissolve in water as needed for muscle cramps        Past Medical History:    Chronic constipation    Chronic rhinitis    Cortical blindness    CP (cerebral palsy), spastic, quadriplegic (HCC)    Esophageal reflux    [facility] SWE[facility]    History of chicken pox    Seizures (HCC)    SEES DR BARRAGAN@ [facility]      Past Surgical History:   Procedure Laterality Date    Bowel resection  06/03/2019    small bowel resection due to SBO @ [facility]    Bowel resection  08/06/2020     Resection of strictured ileocolic anastomosis @ [facility]    Other surgical history      G-TUBE    Other surgical history      HIMA FUNDOPLICATION    Other surgical history      BILTERAL EYE SURGIERIES    Other surgical history      INTRATHECAL PUMP    Other surgical history      HAMSTRING LENGTHENING    Other surgical history      BILATERAL HIP SURGERY    Other surgical history      POSTERIOR SPINAL FUSION FOR SCOLIOSIS    Other surgical history  2/2011  [facility]    Laparoscopic placement of WILFRED gastrostomy button    Other surgical history  7/2011  [facility]    G-button change    Other surgical history  2013    INTRATHECAL BACLOFEN PUMP EXCHANGE    Other surgical history  02/19/2020    exp lap w/ lysis of adhesions      Family History   Problem Relation Age of Onset    Hypertension Father     Obesity Father     Lipids Father     Diabetes Father         pre-dm    Hypertension Mother     Other (Other) Mother         HEMORRHAGIC STROKE    No Known Problems Sister       Social History     Socioeconomic History    Marital status: Single   Tobacco Use    Smoking status: Never    Smokeless tobacco: Never   Vaping Use    Vaping status: Never Used   Substance and Sexual Activity    Alcohol use: No     Alcohol/week: 0.0 standard drinks of alcohol    Drug use: No    Sexual activity: Never     Birth control/protection: Injection     Comment: Depo   Other Topics Concern    Caffeine Concern No    Exercise No    Seat Belt Yes    Special Diet No    Stress Concern No    Weight Concern No     Social Determinants of Health     Food Insecurity: Low Risk  (10/26/2022)    Received from [facility]    Food Insecurity     Have there been times that your food ran out, and you didn't have money to get more?: No     Are there times that you worry that this might happen?: No   Transportation Needs: Low Risk  (10/26/2022)    Received from [facility]    Transportation Needs     Do you have  trouble getting transportation to medical appointments?: No   Social Connections: Low Risk  (4/30/2021)    Received from [facility]    Social Connections     Do you have someone you could call for help if needed?: Yes   Housing Stability: Low Risk  (10/26/2022)    Received from [facility]    Housing Stability     Are you concerned about having a safe and reliable place to live?: No         REVIEW OF SYSTEMS:   GENERAL: fever: +, chills:+, fatigue:  +  SKIN: no rashes  EYES:denies itching, discharge, irritation  ENT:  see hpi, +st, nasal congestion  LUNGS: no shortness of breath ,+ cough, + sputum, no wheezing,no chest tightness  CARDIOVASCULAR: denies chest pain , palpatations  GI: no nausea or abdominal pain  NEURO: denies headaches    EXAM:   /66   Pulse 88   Temp 98 °F (36.7 °C) (Tympanic)   SpO2 94%   GENERAL: well developed, well nourished,in no apparent distress  SKIN: warm and dry, no rashes,   EYES:  conjunctiva are clear, lids and lashes normal  ENT: TMs: normal, Turbinates :congested, Discharge:+, Pharynx: moist mucosa, Tonsils:no erythema  NECK: supple,no adenopathy  LUNGS: Poor inspiratory effort, no signs of respiratory distress, no use of accessory muscles or retractions  CARDIO: RRR without murmur  GI: good BS's,no masses, HSM or tenderness  Neuro: Patient with spastic diplegia and cortical blindness  MSK: Mild scoliosis with postsurgical changes  Recent Results (from the past 24 hour(s))   Rapid Strep    Collection Time: 09/19/24 11:22 AM   Result Value Ref Range    Strep Grp A Screen negative Negative    Control Line Present with a clear background (yes/no) yes Yes/No    Kit Lot # [id number] Numeric    Kit Expiration Date 11/30/2025 Date   COVID19 BinaxNOW Antigen    Collection Time: 09/19/24 11:24 AM   Result Value Ref Range    COVID19 Binax Now Antigen Detected (A) Not Detected    POCT Lot Number [id number]     POCT Expiration Date 8/31/2025     POCT  Procedure Control Control Valid Control Valid     ,966        ASSESSMENT AND PLAN:   Sonal Freitas is a 33 year old female who presents with   Encounter Diagnoses   Name Primary?    COVID-19 virus infection Yes    Viral illness     Spastic quadriplegic cerebral palsy (HCC)     Seizures (HCC)     Restrictive lung disease due to kyphoscoliosis      Orders Placed This Encounter   Procedures    Rapid Strep    COVID19 BinaxNOW Antigen     Meds & Refills for this Visit:  Requested Prescriptions     Signed Prescriptions Disp Refills    nirmatrelvir-ritonavir 150-100 MG Oral Tablet Therapy Pack 30 tablet 0     Sig: Take two nirmatrelvir tablet (150mg) with one ritonavir tablet (100mg) together twice daily for 5 days.     Imaging & Consults:  None  No follow-ups on file.  Patient Instructions   I discussed diagnosis and management strategies.  Would recommend use of albuterol nebulizers with any signs of respiratory difficulty or inability to clear secretions  Tylenol as needed  Based on patient's multiple comorbidities, will start Paxlovid, may be given through G-tube  Increase fluid intake, may use Benadryl and Claritin as needed for nasal congestion  Call or follow-up if no significant improvement or development of any decline in condition over the next 2 to 3 days.  Symptomatic treatment reviewed  Infection control reviewed  The patient indicates understanding of these issues and agrees to the plan.  The patient is asked to return if sx's persist or worsen.   Kal Wilkinson , DO

## 2024-09-19 NOTE — TELEPHONE ENCOUNTER
Spoke with pharmacist who states the script that he wrote for was confusing. He states there are two types of scripts, a renal impaired or the standard dosing. Which one are you wanting, the renal impaired script?

## 2024-11-25 ENCOUNTER — NURSE ONLY (OUTPATIENT)
Dept: OBGYN CLINIC | Facility: CLINIC | Age: 34
End: 2024-11-25
Payer: MEDICARE

## 2024-11-25 VITALS — SYSTOLIC BLOOD PRESSURE: 110 MMHG | DIASTOLIC BLOOD PRESSURE: 78 MMHG | HEART RATE: 98 BPM

## 2025-01-10 ENCOUNTER — OFFICE VISIT (OUTPATIENT)
Dept: FAMILY MEDICINE CLINIC | Facility: CLINIC | Age: 35
End: 2025-01-10
Payer: MEDICARE

## 2025-01-10 ENCOUNTER — LABORATORY ENCOUNTER (OUTPATIENT)
Dept: LAB | Age: 35
End: 2025-01-10
Attending: FAMILY MEDICINE
Payer: MEDICARE

## 2025-01-10 VITALS
HEART RATE: 104 BPM | OXYGEN SATURATION: 98 % | TEMPERATURE: 98 F | DIASTOLIC BLOOD PRESSURE: 60 MMHG | WEIGHT: 98 LBS | SYSTOLIC BLOOD PRESSURE: 106 MMHG | HEIGHT: 63 IN | RESPIRATION RATE: 20 BRPM | BODY MASS INDEX: 17.36 KG/M2

## 2025-01-10 DIAGNOSIS — Z13.220 SCREENING, LIPID: ICD-10-CM

## 2025-01-10 DIAGNOSIS — R56.9 SEIZURES (HCC): ICD-10-CM

## 2025-01-10 DIAGNOSIS — K11.7 HYPERSALIVATION: ICD-10-CM

## 2025-01-10 DIAGNOSIS — H47.619 CORTICAL BLINDNESS, UNSPECIFIED LATERALITY: ICD-10-CM

## 2025-01-10 DIAGNOSIS — Z00.00 ENCOUNTER FOR ANNUAL HEALTH EXAMINATION: Primary | ICD-10-CM

## 2025-01-10 DIAGNOSIS — Z13.0 SCREENING, ANEMIA, DEFICIENCY, IRON: ICD-10-CM

## 2025-01-10 DIAGNOSIS — Z00.00 ENCOUNTER FOR ANNUAL HEALTH EXAMINATION: ICD-10-CM

## 2025-01-10 DIAGNOSIS — J30.89 NON-SEASONAL ALLERGIC RHINITIS, UNSPECIFIED TRIGGER: ICD-10-CM

## 2025-01-10 DIAGNOSIS — Z51.81 MEDICATION MONITORING ENCOUNTER: ICD-10-CM

## 2025-01-10 DIAGNOSIS — Z86.2 HISTORY OF ANEMIA: ICD-10-CM

## 2025-01-10 DIAGNOSIS — M41.9 KYPHOSCOLIOSIS AND SCOLIOSIS: ICD-10-CM

## 2025-01-10 DIAGNOSIS — K59.09 CHRONIC CONSTIPATION: ICD-10-CM

## 2025-01-10 DIAGNOSIS — N31.9 NEUROGENIC BLADDER: ICD-10-CM

## 2025-01-10 DIAGNOSIS — M41.9 RESTRICTIVE LUNG DISEASE DUE TO KYPHOSCOLIOSIS: ICD-10-CM

## 2025-01-10 DIAGNOSIS — G80.0 SPASTIC QUADRIPLEGIC CEREBRAL PALSY (HCC): ICD-10-CM

## 2025-01-10 DIAGNOSIS — J98.4 RESTRICTIVE LUNG DISEASE DUE TO KYPHOSCOLIOSIS: ICD-10-CM

## 2025-01-10 DIAGNOSIS — Z23 NEED FOR VACCINATION: ICD-10-CM

## 2025-01-10 PROBLEM — G80.9 CEREBRAL PALSY (HCC): Status: ACTIVE | Noted: 2025-01-10

## 2025-01-10 LAB
ALBUMIN SERPL-MCNC: 4.3 G/DL (ref 3.2–4.8)
ALBUMIN/GLOB SERPL: 1.1 {RATIO} (ref 1–2)
ALP LIVER SERPL-CCNC: 39 U/L
ALT SERPL-CCNC: 12 U/L
ANION GAP SERPL CALC-SCNC: 9 MMOL/L (ref 0–18)
AST SERPL-CCNC: 20 U/L (ref ?–34)
BASOPHILS # BLD AUTO: 0.05 X10(3) UL (ref 0–0.2)
BASOPHILS NFR BLD AUTO: 0.5 %
BILIRUB SERPL-MCNC: 0.2 MG/DL (ref 0.3–1.2)
BUN BLD-MCNC: 9 MG/DL (ref 9–23)
CALCIUM BLD-MCNC: 9.5 MG/DL (ref 8.7–10.4)
CHLORIDE SERPL-SCNC: 106 MMOL/L (ref 98–112)
CHOLEST SERPL-MCNC: 168 MG/DL (ref ?–200)
CO2 SERPL-SCNC: 23 MMOL/L (ref 21–32)
CREAT BLD-MCNC: 0.45 MG/DL
DEPRECATED HBV CORE AB SER IA-ACNC: 126 NG/ML
EGFRCR SERPLBLD CKD-EPI 2021: 129 ML/MIN/1.73M2 (ref 60–?)
EOSINOPHIL # BLD AUTO: 0.04 X10(3) UL (ref 0–0.7)
EOSINOPHIL NFR BLD AUTO: 0.4 %
ERYTHROCYTE [DISTWIDTH] IN BLOOD BY AUTOMATED COUNT: 13.1 %
GLOBULIN PLAS-MCNC: 3.9 G/DL (ref 2–3.5)
GLUCOSE BLD-MCNC: 82 MG/DL (ref 70–99)
HCT VFR BLD AUTO: 42.5 %
HDLC SERPL-MCNC: 51 MG/DL (ref 40–59)
HGB BLD-MCNC: 13.8 G/DL
IMM GRANULOCYTES # BLD AUTO: 0.04 X10(3) UL (ref 0–1)
IMM GRANULOCYTES NFR BLD: 0.4 %
LDLC SERPL CALC-MCNC: 100 MG/DL (ref ?–100)
LYMPHOCYTES # BLD AUTO: 2.59 X10(3) UL (ref 1–4)
LYMPHOCYTES NFR BLD AUTO: 27.8 %
MCH RBC QN AUTO: 30.9 PG (ref 26–34)
MCHC RBC AUTO-ENTMCNC: 32.5 G/DL (ref 31–37)
MCV RBC AUTO: 95.1 FL
MONOCYTES # BLD AUTO: 1.01 X10(3) UL (ref 0.1–1)
MONOCYTES NFR BLD AUTO: 10.8 %
NEUTROPHILS # BLD AUTO: 5.6 X10 (3) UL (ref 1.5–7.7)
NEUTROPHILS # BLD AUTO: 5.6 X10(3) UL (ref 1.5–7.7)
NEUTROPHILS NFR BLD AUTO: 60.1 %
NONHDLC SERPL-MCNC: 117 MG/DL (ref ?–130)
OSMOLALITY SERPL CALC.SUM OF ELEC: 284 MOSM/KG (ref 275–295)
PLATELET # BLD AUTO: 166 10(3)UL (ref 150–450)
POTASSIUM SERPL-SCNC: 4.7 MMOL/L (ref 3.5–5.1)
PROT SERPL-MCNC: 8.2 G/DL (ref 5.7–8.2)
RBC # BLD AUTO: 4.47 X10(6)UL
SODIUM SERPL-SCNC: 138 MMOL/L (ref 136–145)
TRIGL SERPL-MCNC: 91 MG/DL (ref 30–149)
VALPROATE SERPL-MCNC: 89.8 UG/ML (ref 50–100)
VLDLC SERPL CALC-MCNC: 15 MG/DL (ref 0–30)
WBC # BLD AUTO: 9.3 X10(3) UL (ref 4–11)

## 2025-01-10 PROCEDURE — 80053 COMPREHEN METABOLIC PANEL: CPT

## 2025-01-10 PROCEDURE — 36415 COLL VENOUS BLD VENIPUNCTURE: CPT

## 2025-01-10 PROCEDURE — 82728 ASSAY OF FERRITIN: CPT

## 2025-01-10 PROCEDURE — 80061 LIPID PANEL: CPT

## 2025-01-10 PROCEDURE — 85025 COMPLETE CBC W/AUTO DIFF WBC: CPT

## 2025-01-10 PROCEDURE — 80164 ASSAY DIPROPYLACETIC ACD TOT: CPT

## 2025-01-10 RX ORDER — MONTELUKAST SODIUM 10 MG/1
10 TABLET ORAL NIGHTLY
Qty: 90 TABLET | Refills: 3 | Status: SHIPPED | OUTPATIENT
Start: 2025-01-10

## 2025-01-10 RX ORDER — GLYCOPYRROLATE 1 MG/1
1 TABLET ORAL 2 TIMES DAILY
Qty: 180 TABLET | Refills: 1 | Status: SHIPPED | OUTPATIENT
Start: 2025-01-10

## 2025-01-10 NOTE — PATIENT INSTRUCTIONS
1. Encounter for annual health examination  Reviewed age-appropriate preventive health screening exams as well as immunizations.  Flu vaccine provided.  I reviewed recent gynecology eval and most recent urology appointment  - Lipid Panel [E]; Future  - Comp Metabolic Panel (14) [E]; Future  - Ferritin [E]; Future  - CBC W Differential W Platelet [E]; Future    2. Spastic quadriplegic cerebral palsy (HCC)  Monitor clinically, continue supportive care, monitor for skin breakdown daily    3. Seizures (HCC)  Continue neurology follow-up, check antiepileptic levels  - VALPROIC ACID [916] [Q]; Future    4. Restrictive lung disease due to kyphoscoliosis  Discussed the importance of illness prevention given significant restrictive lung disease.  Monitor clinically    5. Cortical blindness, unspecified laterality  Continue supportive care    6. Neurogenic bladder  Continue current meds and urology follow-up, monitor clinically    7. Non-seasonal allergic rhinitis, unspecified trigger  Continue montelukast    8. Chronic constipation  Monitor BMs daily.  Continue MiraLAX and fluid intake, discussed avoidance of excessive dairy     9. Kyphoscoliosis and scoliosis  Monitor clinically, discussed importance of good skin care    10. Medication monitoring encounter  Check labs  - Comp Metabolic Panel (14) [E]; Future  - VALPROIC ACID [916] [Q]; Future    11. Screening, lipid  Reviewed goals for lipids  - Lipid Panel [E]; Future    12. Screening, anemia, deficiency, iron  Check labs  - Ferritin [E]; Future  - CBC W Differential W Platelet [E]; Future    13. Hypersalivation  Resume Robinul twice daily, may reduce to daily with a second dose as needed

## 2025-01-10 NOTE — H&P
HPI:   Sonal Freitas is a 34 year old female   Chief Complaint   Patient presents with    Physical     Medicare AWV  Reviewed Preventative/Wellness form with patient.      Allergies    Medication Follow-Up     Here w/ father and caregiver  Needs a refill on robinol    Wt Readings from Last 6 Encounters:   01/10/25 98 lb (44.5 kg)   09/05/24 98 lb (44.5 kg)   06/11/24 106 lb (48.1 kg)   03/26/24 106 lb (48.1 kg)   01/10/24 105 lb (47.6 kg)   01/02/24 110 lb (49.9 kg)     Body mass index is 17.36 kg/m².     AST (U/L)   Date Value   12/07/2021 8 (L)   07/27/2019 14   07/27/2019 12   05/23/2014 16   02/11/2013 39     ALT (U/L)   Date Value   12/07/2021 19   07/27/2019 13   07/27/2019 13   05/23/2014 20   02/11/2013 24        Current Outpatient Medications   Medication Sig Dispense Refill    clotrimazole-betamethasone 1-0.05 % External Cream APPLY THIN LAYER TOPICALLY TO THE AFFECTED AREA THREE TIMES DAILY UNTIL CLEAR 15 g 1    montelukast 10 MG Oral Tab Take 1 tablet (10 mg total) by mouth nightly. 90 tablet 3    sucralfate 1 g Oral Tab Take 1 tablet (1 g total) by mouth 4 (four) times daily before meals and nightly. May crush the tablet to form a suspension 40 tablet 1    NAYZILAM 5 MG/0.1ML Nasal Solution 5 mg by Nasal route as needed (For break through seizures).      Valproate Sodium (VALPROIC ACID) 250 MG/5ML Oral Solution Take 7 mL by mouth in the morning and 7 mL before bedtime.      Wound Dressings ([facility] WOUND/BURN DRESSING) External Paste Apply 1 Application topically 2 (two) times daily as needed. 1 Tube 1    Methenamine Hippurate 1 g Oral Tab Take 1 tablet (1 g total) by mouth Q12H.      magnesium 250 MG Oral Tab Take 1 tablet (250 mg total) by mouth 2 (two) times daily. Take 1/2 tablet twice daily crushed then dissolve in water as needed for muscle cramps      bisacodyl 10 MG Rectal Suppos Place 1 suppository (10 mg total) rectally daily. 30 suppository 0    Cetirizine HCl 5 MG/5ML Oral Syrup Take  10 mL by mouth daily.      Fluticasone Propionate 50 MCG/ACT Nasal Suspension 2 sprays by Each Nare route daily.      Polyethylene Glycol 3350 Oral Powder   0    Misc Natural Products (CRANBERRY/PROBIOTIC OR) Take 1 tablet by mouth daily.      Potassium Gluconate 595 MG Oral Cap Take 1 tablet by mouth 2 (two) times daily.      Multiple Vitamins-Minerals (CENTRUM) Oral Liquid Take 10 mL by mouth daily.      Baclofen 0.05 MG/ML Intrathecal Solution by Intrathecal route.      glycopyrrolate 1 MG Oral Tab Take 1 tablet (1 mg total) by mouth 2 (two) times daily.      albuterol (2.5 MG/3ML) 0.083% Inhalation Nebu Soln INHALE 1 VIAL IN NEBULIZER EVERY 4 HOURS AS NEEDED FOR WHEEZING OR SHORTNESS OF BREATH 75 mL 0    mupirocin 2 % External Ointment Apply 1 Application topically 3 (three) times daily as needed. APPLY TO AFFECTED AREA (Patient not taking: Reported on 1/10/2025)      Nutritional Supplements (BOOST OR) Take by mouth.      Pseudoephedrine HCl 30 MG Oral Tab Take 1 tablet (30 mg total) by mouth 2 (two) times daily as needed for congestion.      DiphenhydrAMINE HCl 12.5 MG/5ML Oral Liquid Take 10 mL (25 mg total) by mouth nightly as needed for Allergies.      Mometasone Furoate 0.1 % External Cream Apply 1 Application topically daily as needed. (Patient not taking: Reported on 1/10/2025) 15 g 0    baclofen (LIORESAL) 10 MG Oral Tab Take 1.5 tablets (15 mg total) by mouth 3 (three) times daily. To be used in case of baclofen pump failure  6    acetaminophen (TYLENOL INFANTS) 160 MG/5ML Oral Suspension Take 19.5 mL by mouth every 4 (four) hours as needed for Fever or Pain. 240 mL 0    Glycerin, Laxative, (GLYCERIN, CHILD, RE) Place  rectally. (Patient not taking: Reported on 1/10/2025)       Allergies[1]     Past Medical History:    Chronic constipation    Chronic rhinitis    Cortical blindness    CP (cerebral palsy), spastic, quadriplegic (HCC)    Esophageal reflux    [facility]    History of  chicken pox    Seizures (HCC)    SEES DR BARRAGAN@ [facility]      Past Surgical History:   Procedure Laterality Date    Bowel resection  06/03/2019    small bowel resection due to SBO @ [facility]    Bowel resection  08/06/2020    [facility]    Other surgical history      G-TUBE    Other surgical history      HIMA FUNDOPLICATION    Other surgical history      BILTERAL EYE SURGIERIES    Other surgical history      INTRATHECAL PUMP    Other surgical history      HAMSTRING LENGTHENING    Other surgical history      BILATERAL HIP SURGERY    Other surgical history      POSTERIOR SPINAL FUSION FOR SCOLIOSIS    Other surgical history  2/2011  [facility]    Laparoscopic placement of WILFRED gastrostomy button    Other surgical history  7/2011  [facility]    G-button change    Other surgical history  2013    INTRATHECAL BACLOFEN PUMP EXCHANGE    Other surgical history  02/19/2020    exp lap w/ lysis of adhesions      Family History   Problem Relation Age of Onset    Hypertension Father     Obesity Father     Lipids Father     Diabetes Father         pre-dm    Hypertension Mother     Other (Other) Mother         HEMORRHAGIC STROKE    No Known Problems Sister         Social History     Socioeconomic History    Marital status: Single   Tobacco Use    Smoking status: Never    Smokeless tobacco: Never   Vaping Use    Vaping status: Never Used   Substance and Sexual Activity    Alcohol use: No     Alcohol/week: 0.0 standard drinks of alcohol    Drug use: No    Sexual activity: Never     Birth control/protection: Injection     Comment: Depo   Other Topics Concern    Caffeine Concern No    Exercise No    Seat Belt Yes    Special Diet No    Stress Concern No    Weight Concern No     Social Drivers of Health     Food Insecurity: Low Risk  (10/26/2022)    [facility]    Food Insecurity     Have there been times that your food ran  out, and you didn't have money to get more?: No     Are there times that you worry that this might happen?: No   Transportation Needs: Low Risk  (10/26/2022)    Received from [facility]    Transportation Needs     Do you have trouble getting transportation to medical appointments?: No   Social Connections: Low Risk  (4/30/2021)    Received from [facility]    Social Connections     Do you have someone you could call for help if needed?: Yes   Housing Stability: Low Risk  (10/26/2022)    Received from [facility]    Housing Stability     Are you concerned about having a safe and reliable place to live?: No       [employer]  Patient eats a general diet and will receive Ensure or boost via G-tube when appetite is poor, weight loss or illness decreases adequate caloric intake.  Patient will also receive free water via G-tube between's 1000 mL and 1500 mL daily to supplement oral intake  Specialists: Dr Rodriguez/ [facility]amo- pain pump , Dr Young- gastroenterologist, Dr Mayfield- neurology , Dr Mondragon- Urologist, [facility]- gyne, Dr Molina-general surgery     REVIEW OF SYSTEMS:     GENERAL: generally feels well, no unusual fatigue,no excessive daytime drowsiness, good appetite, wt stable over the past year, fluids 8074-1486 ml thru G-tube + 300-600 ml orally, favorite food is eggs  SKIN: denies any skin breakdown or rashes, chronic intermittent skin breakdown in both pinna, patient here tends to rub on her head support, intermittent redness in groin creases   EYES:+cortical blindness  HEENT: denies ST, denies snoring and reported periods of apnea, denies hearing deficits, +increased drooling off robinol  LUNGS: denies shortness of breath with exertion, no coughing or wheezing  CARDIOVASCULAR: denies chest pain on exertion, palpations,  anginal equivalent symptoms, no claudication   GI: denies abdominal pain,denies heartburn, diarrhea, or change in bowel habits, all meds thru g-tube, all nutrition po except when decreased intake, constipation -controlled on 1/2 dose miralax and mag ox, usually BM daily  : denies dysuria, vaginal discharge or itching, no menses on depo-provera, + incontinence , recurrent UTIs-managed on bid methanamine, getting Depo-provera thru gyne, last exam 6/11/24, unable to perform Pap or bimanual, patient had urology eval on 1/5/2024.  Dr. Serra, postvoid residual greater than 181 mL  MUSCULOSKELETAL: denies back or joint pain, no braces or adaptive equipment, patient in specialized wheelchair  NEURO: denies headaches, tremors, dizziness, numbness, tingling, truncal weakness, spasms of lower extremities, managed with additional baclofen via G-tube, patient had a seizure in October around her birthday  PSYCHE: denies depression or anxiety, denies any sleep difficulty  HEMATOLOGIC: denies unexplained bruising or bleeding   ENDOCRINE: denies heat or cold intolerance , no unexplained wt loss or gain  FUNCTIONAL ABILITY: Do you need help with preparing meals? yes, Dressing? yes, Hygiene? yes, Using the bathroom? yes, Transportation? yes, Shopping? yes, Taking medications? yes, Banking? yes  SAFETY: Does your home have throw rugs? no, Adequate lighting? yes, Grab bars in bathroom/shower/tub? yes,  Handrails on stairs? yes, Some alarms? yes   Get and Go test > 12 seconds?  yes  EXAM:   /60 (BP Location: Left arm, Patient Position: Sitting, Cuff Size: adult)   Pulse 104   Temp 97.8 °F (36.6 °C) (Temporal)   Resp 20   Ht 5' 3\" (1.6 m)   Wt 98 lb (44.5 kg)   SpO2 98%   BMI 17.36 kg/m²   Body mass index is 17.36 kg/m².   GENERAL: well developed, well nourished,in no apparent distress  SKIN: no suspicious lesions, SQ pump RLQ, G-tube site LLQ clean and dry, 7 mm epidermal nevus right occipital area, no irritation ,  superficial left pinna redness  HEENT: Ears: TMs intact, canals clear, hearing normal, Nose: turbinates pale and congested with clear mucoid dc,Septum midline, Pharynx: no pnd, erythema, or airway obstruction, good dental repair, hypersalivation  EYES:PERRLA, dysconjugate gaze,  Fundi: benign,conjunctiva are clear, lids and lashes normal  NECK: supple,no adenopathy,no bruits, no thyromegaly or palpable nodules  BREAST: deferred  LUNGS: clear to auscultation, no w/r/r, poor inspiratory effort  CARDIO: RRR without murmur, strong peripheral pulses, no peripheral edema  GI: good BS's,no masses, HSM or tenderness, soft, well healed post surgical scars  :  deferred  RECTAL:deferred  MUSCULOSKELETAL: + scoliosis, + flexion contractures of both knees, elbows and wrists, and hands, hypotonia of the trunk  EXTREMITIES:  no cyanosis, clubbing or edema, no varicosities  NEURO: Patient able to answer yes or no questions, occasional one-word answers, sensory grossly intact, DTRs +4/4 bilaterally, hyperreflexia   PSYCH:  Speech soft, appearance: neat, Affect:smiling  ASSESSMENT AND PLAN:   Sonal Freitas is a 34 year old female   Encounter Diagnoses   Name Primary?    Encounter for annual health examination Yes    Spastic quadriplegic cerebral palsy (HCC)     Seizures (HCC)     Restrictive lung disease due to kyphoscoliosis     Cortical blindness, unspecified laterality     Neurogenic bladder     Non-seasonal allergic rhinitis, unspecified trigger     Chronic constipation     Kyphoscoliosis and scoliosis     Medication monitoring encounter     Screening, lipid     Screening, anemia, deficiency, iron     Hypersalivation      Orders Placed This Encounter   Procedures    Lipid Panel [E]    Comp Metabolic Panel (14) [E]    Ferritin [E]    CBC W Differential W Platelet [E]    VALPROIC ACID [916] [Q]     Meds & Refills for this Visit:  Requested Prescriptions     Signed Prescriptions Disp Refills    montelukast 10 MG Oral Tab 90  tablet 3     Sig: Take 1 tablet (10 mg total) by mouth nightly.    glycopyrrolate 1 MG Oral Tab 180 tablet 1     Sig: Take 1 tablet (1 mg total) by mouth 2 (two) times daily.     Imaging & Consults:  None  No follow-ups on file.  Patient Instructions   1. Encounter for annual health examination  Reviewed age-appropriate preventive health screening exams as well as immunizations.  Flu vaccine provided.  I reviewed recent gynecology eval and most recent urology appointment  - Lipid Panel [E]; Future  - Comp Metabolic Panel (14) [E]; Future  - Ferritin [E]; Future  - CBC W Differential W Platelet [E]; Future    2. Spastic quadriplegic cerebral palsy (HCC)  Monitor clinically, continue supportive care, monitor for skin breakdown daily    3. Seizures (HCC)  Continue neurology follow-up, check antiepileptic levels  - VALPROIC ACID [916] [Q]; Future    4. Restrictive lung disease due to kyphoscoliosis  Discussed the importance of illness prevention given significant restrictive lung disease.  Monitor clinically    5. Cortical blindness, unspecified laterality  Continue supportive care    6. Neurogenic bladder  Continue current meds and urology follow-up, monitor clinically    7. Non-seasonal allergic rhinitis, unspecified trigger  Continue montelukast    8. Chronic constipation  Monitor BMs daily.  Continue MiraLAX and fluid intake, discussed avoidance of excessive dairy     9. Kyphoscoliosis and scoliosis  Monitor clinically, discussed importance of good skin care    10. Medication monitoring encounter  Check labs  - Comp Metabolic Panel (14) [E]; Future  - VALPROIC ACID [916] [Q]; Future    11. Screening, lipid  Reviewed goals for lipids  - Lipid Panel [E]; Future    12. Screening, anemia, deficiency, iron  Check labs  - Ferritin [E]; Future  - CBC W Differential W Platelet [E]; Future    13. Hypersalivation  Resume Robinul twice daily, may reduce to daily with a second dose as needed    Kal Wilkinson, DO,  FAAFP           [1]   Allergies  Allergen Reactions    Levonorgestrel OTHER (SEE COMMENTS)     uknown    Vancomycin OTHER (SEE COMMENTS)     Scout syndrome    Seasonal

## 2025-02-12 ENCOUNTER — NURSE ONLY (OUTPATIENT)
Dept: OBGYN CLINIC | Facility: CLINIC | Age: 35
End: 2025-02-12
Payer: MEDICARE

## 2025-02-12 VITALS
BODY MASS INDEX: 17.36 KG/M2 | DIASTOLIC BLOOD PRESSURE: 82 MMHG | HEART RATE: 106 BPM | WEIGHT: 98 LBS | HEIGHT: 63 IN | SYSTOLIC BLOOD PRESSURE: 114 MMHG

## 2025-02-12 NOTE — PROGRESS NOTES
Patient is here for DEPO injection. DEPO given in right arm. Patient tolerated well. Next DEPO due between April 30th - May14th. Card given to care giver.

## 2025-04-21 ENCOUNTER — TELEPHONE (OUTPATIENT)
Dept: FAMILY MEDICINE CLINIC | Facility: CLINIC | Age: 35
End: 2025-04-21

## 2025-04-21 NOTE — TELEPHONE ENCOUNTER
Order form for incontinence products received via fax from [facility]. Pending signature will fax to [phone]    Patient seen in office 1/10/2025

## 2025-04-30 ENCOUNTER — NURSE ONLY (OUTPATIENT)
Dept: OBGYN CLINIC | Facility: CLINIC | Age: 35
End: 2025-04-30
Payer: MEDICARE

## 2025-04-30 VITALS
DIASTOLIC BLOOD PRESSURE: 64 MMHG | WEIGHT: 98 LBS | SYSTOLIC BLOOD PRESSURE: 102 MMHG | BODY MASS INDEX: 17 KG/M2 | HEART RATE: 95 BPM

## 2025-04-30 PROCEDURE — 96372 THER/PROPH/DIAG INJ SC/IM: CPT | Performed by: NURSE PRACTITIONER

## 2025-04-30 RX ADMIN — MEDROXYPROGESTERONE ACETATE 150 MG: 150 INJECTION, SUSPENSION INTRAMUSCULAR at 10:22:00

## 2025-04-30 NOTE — PROGRESS NOTES
Patient given Depo into left arm today. Tolerated well, patient given date range for next appointment.

## 2025-06-02 RX ORDER — CLOTRIMAZOLE AND BETAMETHASONE DIPROPIONATE 10; .64 MG/G; MG/G
CREAM TOPICAL
Qty: 15 G | Refills: 1 | Status: SHIPPED | OUTPATIENT
Start: 2025-06-02

## 2025-06-02 NOTE — TELEPHONE ENCOUNTER
Requested Prescriptions     Pending Prescriptions Disp Refills    clotrimazole-betamethasone 1-0.05 % External Cream [Pharmacy Med Name: CLOTRIMAZOLE-BETAMETHASONE CRM 15GM] 15 g 1     Sig: APPLY THIN LAYER TOPICALLY TO THE AFFECTED AREA THREE TIMES DAILY UNTIL GONE     Last refill 4/12/24 x 1 refill  LOV 1/10/25  Future Appointments   Date Time Provider Department Center   7/16/2025 10:00 AM [facility] N[facility]SE [place] [place]

## 2025-07-16 ENCOUNTER — NURSE ONLY (OUTPATIENT)
Dept: OBGYN CLINIC | Facility: CLINIC | Age: 35
End: 2025-07-16
Payer: MEDICARE

## 2025-07-16 VITALS
HEIGHT: 63 IN | SYSTOLIC BLOOD PRESSURE: 98 MMHG | DIASTOLIC BLOOD PRESSURE: 58 MMHG | BODY MASS INDEX: 17.36 KG/M2 | HEART RATE: 73 BPM | WEIGHT: 98 LBS

## 2025-07-16 DIAGNOSIS — Z30.42 ENCOUNTER FOR SURVEILLANCE OF INJECTABLE CONTRACEPTIVE: Primary | ICD-10-CM

## 2025-07-16 PROCEDURE — 96372 THER/PROPH/DIAG INJ SC/IM: CPT | Performed by: STUDENT IN AN ORGANIZED HEALTH CARE EDUCATION/TRAINING PROGRAM

## 2025-07-16 RX ORDER — MEDROXYPROGESTERONE ACETATE 150 MG/ML
150 INJECTION, SUSPENSION INTRAMUSCULAR ONCE
Status: COMPLETED | OUTPATIENT
Start: 2025-07-16 | End: 2025-07-16

## 2025-07-16 RX ADMIN — MEDROXYPROGESTERONE ACETATE 150 MG: 150 INJECTION, SUSPENSION INTRAMUSCULAR at 10:26:00

## 2025-07-16 NOTE — PROGRESS NOTES
Patient presented today to receive depo injection. Patient requested injection into left arm this visit. Injection tolerated well. Patient and guardian advised of date range for next appointment of Sept. 16- Sept 30th.

## 2025-08-19 ENCOUNTER — OFFICE VISIT (OUTPATIENT)
Dept: FAMILY MEDICINE CLINIC | Facility: CLINIC | Age: 35
End: 2025-08-19

## 2025-08-19 VITALS
OXYGEN SATURATION: 98 % | DIASTOLIC BLOOD PRESSURE: 80 MMHG | HEIGHT: 63 IN | SYSTOLIC BLOOD PRESSURE: 128 MMHG | HEART RATE: 80 BPM | BODY MASS INDEX: 17 KG/M2 | TEMPERATURE: 99 F | RESPIRATION RATE: 16 BRPM

## 2025-08-19 DIAGNOSIS — J02.9 SORE THROAT: Primary | ICD-10-CM

## 2025-08-19 DIAGNOSIS — J30.89 NON-SEASONAL ALLERGIC RHINITIS, UNSPECIFIED TRIGGER: ICD-10-CM

## 2025-08-19 LAB
CONTROL LINE PRESENT WITH A CLEAR BACKGROUND (YES/NO): YES YES/NO
KIT LOT #: NORMAL NUMERIC

## 2025-08-19 PROCEDURE — 87880 STREP A ASSAY W/OPTIC: CPT | Performed by: FAMILY MEDICINE

## 2025-08-19 PROCEDURE — 87081 CULTURE SCREEN ONLY: CPT | Performed by: FAMILY MEDICINE

## 2025-08-19 PROCEDURE — 99213 OFFICE O/P EST LOW 20 MIN: CPT | Performed by: FAMILY MEDICINE

## (undated) NOTE — MR AVS SNAPSHOT
[facility]olm  [address] [address]  [phone]               Thank you for choosing us for your health care visit with Corinne Rodrigues. Kaylie Snow DO.   We are glad to serve you and happy to provide you with this sum clotrimazole-betamethasone 1-0.05 % Crea   APPLY THINLY THREE TIMES DAILY TIL CLEAR. Commonly known as:  LOTRISONE           CRANBERRY/PROBIOTIC OR   Take 1 tablet by mouth daily.            CUSTOM MEDICATION   Granlex spray spray on hands to apply topic Take 500 mg by mouth daily. Commonly known as:  VITAMIN C           VITAMIN D OR   Take 1,000 Units by mouth daily. * Notice: This list has 4 medication(s) that are the same as other medications prescribed for you.  Read the directions carefull HOW TO GET STARTED: HOW TO STAY MOTIVATED:   Start activities slowly and build up over time Do what you like   Get your heart pumping – brisk walking, biking, swimming Even 10 minute increments are effective and add up over the week   2 ½ hours per week –

## (undated) NOTE — LETTER
Consent for Administration of [address]   Occasionally, pharmaceuticals required/requested to be administered by [facility] staff in office/clinic settings cannot be purchased/made available by [facility] (typically parenteral). Additionally, even when medications are available, patient out-of-pocket expense may be higher unless the medication is acquired through insurance/medical coverage-approved agreements/plans. This written consent form is used when patient and/or insurer communications result in a decision to allow the alternative option of patient-supplied pharmaceutical use by [facility] staff medication administration in the office/clinic setting after an assessed absence of associated risk and in the presence of obvious patient expense and convenience benefits. If, and when, all the following safety measures and guidelines are employed by the patient and/or his/her guardian/HCPOA/Agent, use is allowed based on consent until or unless it is rescinded after signed.               Patient Name: Sandra Kaiser  Patient YOB: 1990  Patient Medical Record Identifier: [id number]  Patient-Supplied Pharmaceutical(s) Med Name/Dose/Route/Applicable Begin or End Date/Special Administration Instructions/Ordering Provider, if different from administering provider office oversight:  ______________________________________________________________________  ____Medroxyprogesterone susp__________________________________________________________________  ____________________________________________________________________________________________________________________________________________     I, (patient and/or designee and relationship) ________________________________, attest, based on my personal knowledge and handling, that the above noted pharmaceutical(s) provided for [facility] administration, were maintained and/or managed in transport according to [facility] and/or specific prescriber instructions. This includes, but is not limited to, temperature and light requirements/precautions. I attest that, while in my possession, the pharmaceutical has not been tampered with or damaged and that any assessed damage to the original seal will result in the medication being ineligible for administration by [facility] staff, requiring replacement by patient for reconsideration. I am aware of the risks, benefits and alternatives of receiving the medication, have had the opportunity to ask clarifying questions and agree to indemnify and hold harmless [facility] and its affiliates, officers, board members, employees and agents from any and all claims and liability for injuries or damages arising directly or indirectly as a result of administration of self-supplied pharmaceuticals including, but not limited to, unanticipated symptoms, allergic reaction(s), side effect(s) and/or known complications arising from use/administration.       Patient Printed Name:  Paty Hernandez  Patient Signature: ___________________________________________________  [facility] staff Witness:  Duglas Pendleton RN  Date/Time:  11/23/2022/11:17 AM

## (undated) NOTE — LETTER
Notifier: [place]       Patient Name: Sonal Freitas       Identification Number: [id number]                          Advance Beneficiary Notice of Noncoverage (ABN)   NOTE:  If Medicare doesn’t pay for D. Items/service(s) below, you may have to pay.  Medicare does not pay for everything, even some care that you or your health care provider have good reason to think you need. We expect Medicare may not pay for the D. items/service(s) below.  Items or Services Reason Medicare May Not Pay: Estimated Cost   __EKG ($129.00)  _x_Pap smear ($48.23) _x_Pelvic/Breast ($65.00)  __ Ear Irrigation ($149)  __ Injection(s)  ___ Tdap ($70)       ___ Meningitis ($206)   __Prevnar ($285)  ___ Td ($51)            ___Shingrix ($215)        __Prevnar 20 ($309)  ___ Hep A ($156)   ___Prolia ($1827.00)     __ Xiaflex ($              )   ___ Hep B ($167)      __Pneumovax ($155)                                            ___ Vaccine Administration ($31)   __ Medicare does not cover this service      _x_ Medicare may not pay for this   item/service for your condition     _x_ Medicare may not pay for this item/service as often as this      $113.23   WHAT YOU NEED TO DO NOW:  Read this notice, so you can make an informed decision about your care.  Ask us any questions that you may have after you finish reading.  Choose an option below about whether to receive the D. item/service(s)  listed above.  Note: If you choose Option 1 or 2, we may help you to use any other insurance that you might have, but Medicare cannot require us to do this.  OPTIONS: Check only one box.  We cannot choose a box for you.   OPTION 1. I want the D. item/service(s) listed above. You may ask to be paid now, but I also want Medicare billed for an official decision on payment, which is sent to me on a Medicare Summary Notice (MSN). I understand that if Medicare doesn’t pay, I am responsible for payment, but I can appeal to Medicare by following the  directions on the MSN. If Medicare does pay, you will refund any payments I made to you, less co-pays or deductibles.  OPTION 2. I want the D. item/service(s) listed above, but do not bill Medicare. You may ask to be paid now as I am responsible for payment. I cannot appeal if Medicare is not billed.  OPTION 3. I don't want the D. item/service(s) listed above. I understand with this choice I am not responsible for payment, and I cannot appeal to see if Medicare would pay.    H. Additional Information:    This notice gives our opinion, not an official Medicare decision. If you have other questions on this notice or Medicare billing, call 1-800-MEDICARE ([phone]/TTY: [phone]). Signing below means that you have received and understand this notice. You also receive a copy.  Signature: Date:       You have the right to get Medicare information in an accessible format, like large print, Braille, or audio. You also have the right to file a complaint if you feel you’ve been discriminated against. Visit Medicare.gov/about- us/hdsvwajbsrqfx-uirncqfjqfrpfwrnx-hptytn.  According to the Paperwork Reduction Act of 1995, no persons are required to respond to a collection of information unless it displays a valid OMB control number. The valid OMB control number for this information collection is 3102-3531. The time required to complete this information collection is estimated to average 7 minutes per response, including the time to review instructions, search existing data resources, gather the data needed, and complete and review the information collection. If you have comments concerning the accuracy of the time estimate or suggestions for improving this form, please write to: CMS, [facility] [employer]     [place], Attn: [employer] Reports Clearance Officer, [place], [place] [address].  Form CMS-R-131 (Exp. 1/31/2026) Form Approved OMB No. [id number]